# Patient Record
Sex: FEMALE | Race: ASIAN | NOT HISPANIC OR LATINO | URBAN - METROPOLITAN AREA
[De-identification: names, ages, dates, MRNs, and addresses within clinical notes are randomized per-mention and may not be internally consistent; named-entity substitution may affect disease eponyms.]

---

## 2017-01-11 ENCOUNTER — EMERGENCY (EMERGENCY)
Facility: HOSPITAL | Age: 37
LOS: 1 days | Discharge: HOME | End: 2017-01-11
Admitting: EMERGENCY MEDICINE
Payer: MEDICAID

## 2017-01-11 VITALS
HEART RATE: 85 BPM | RESPIRATION RATE: 18 BRPM | TEMPERATURE: 98 F | SYSTOLIC BLOOD PRESSURE: 132 MMHG | OXYGEN SATURATION: 98 % | DIASTOLIC BLOOD PRESSURE: 65 MMHG

## 2017-01-11 PROCEDURE — 99283 EMERGENCY DEPT VISIT LOW MDM: CPT

## 2017-01-11 RX ORDER — HYDROXYZINE HCL 10 MG
25 TABLET ORAL ONCE
Qty: 0 | Refills: 0 | Status: COMPLETED | OUTPATIENT
Start: 2017-01-11 | End: 2017-01-11

## 2017-01-11 RX ORDER — FAMOTIDINE 10 MG/ML
1 INJECTION INTRAVENOUS
Qty: 14 | Refills: 0 | OUTPATIENT
Start: 2017-01-11 | End: 2017-01-18

## 2017-01-11 RX ORDER — FAMOTIDINE 10 MG/ML
20 INJECTION INTRAVENOUS ONCE
Qty: 0 | Refills: 0 | Status: COMPLETED | OUTPATIENT
Start: 2017-01-11 | End: 2017-01-11

## 2017-01-11 RX ORDER — HYDROXYZINE HCL 10 MG
1 TABLET ORAL
Qty: 20 | Refills: 0 | OUTPATIENT
Start: 2017-01-11

## 2017-01-11 RX ADMIN — Medication 60 MILLIGRAM(S): at 09:05

## 2017-01-11 RX ADMIN — FAMOTIDINE 20 MILLIGRAM(S): 10 INJECTION INTRAVENOUS at 09:05

## 2017-01-11 RX ADMIN — Medication 25 MILLIGRAM(S): at 09:05

## 2017-01-11 NOTE — ED ADULT TRIAGE NOTE - CHIEF COMPLAINT QUOTE
p/t c/o of rash and itchiness  to bilateral thighs for few days denies any other discomfort no airway issues noted

## 2017-01-11 NOTE — ED PROVIDER NOTE - OBJECTIVE STATEMENT
36 yo female c pmhx hypothyroid, HLD here c/o pruritic rash to b/l inner thighs, shoulders and scalp since yesterday afternoon. Pt states ate spicy food yesterday which she normally does and shortly after started having this rash. Has had a fungal rash around the same area from prior which she used clotrimazole lotion for. Denies any fever, chills, recent travel, n/v, abd pain, sob, tongue swelling, using any new soaps/detergents or meds. Pt states took benadryl last night without much improvement.

## 2017-01-11 NOTE — ED PROVIDER NOTE - PLAN OF CARE
Take prednisone 40mg once a day x 4 days. Pepcid 20mg 2x/day. Atarax 25mg every 6 hours as needed for itching (do not drive while taking this). Drink plenty of fluids. Follow up with a dermatologist within 2 weeks. Return to ED for any worsening rash, tongue swelling, shortness of breath or any other worsening symptoms.

## 2017-01-11 NOTE — ED PROVIDER NOTE - MEDICAL DECISION MAKING DETAILS
37 yo female with pruritic hives to b/l inner thigh since yesterday. Unclear trigger. No resp distress. Will treat with prednisone, atarax, pepcid and give derm follow up.

## 2017-01-11 NOTE — ED PROVIDER NOTE - CARE PLAN
Principal Discharge DX:	Rash  Instructions for follow-up, activity and diet:	Take prednisone 40mg once a day x 4 days. Pepcid 20mg 2x/day. Atarax 25mg every 6 hours as needed for itching (do not drive while taking this). Drink plenty of fluids. Follow up with a dermatologist within 2 weeks. Return to ED for any worsening rash, tongue swelling, shortness of breath or any other worsening symptoms.

## 2017-01-17 ENCOUNTER — LABORATORY RESULT (OUTPATIENT)
Age: 37
End: 2017-01-17

## 2017-01-17 ENCOUNTER — APPOINTMENT (OUTPATIENT)
Dept: DERMATOLOGY | Facility: CLINIC | Age: 37
End: 2017-01-17

## 2017-01-17 VITALS
WEIGHT: 170 LBS | HEIGHT: 64 IN | SYSTOLIC BLOOD PRESSURE: 118 MMHG | BODY MASS INDEX: 29.02 KG/M2 | DIASTOLIC BLOOD PRESSURE: 74 MMHG

## 2017-01-17 DIAGNOSIS — L30.9 DERMATITIS, UNSPECIFIED: ICD-10-CM

## 2017-01-17 LAB
ALBUMIN SERPL ELPH-MCNC: 4.2 G/DL
ALP BLD-CCNC: 91 U/L
ALT SERPL-CCNC: 18 U/L
ANION GAP SERPL CALC-SCNC: 14 MMOL/L
AST SERPL-CCNC: 18 U/L
BILIRUB SERPL-MCNC: 0.2 MG/DL
BUN SERPL-MCNC: 14 MG/DL
CALCIUM SERPL-MCNC: 9.7 MG/DL
CHLORIDE SERPL-SCNC: 102 MMOL/L
CO2 SERPL-SCNC: 23 MMOL/L
CREAT SERPL-MCNC: 0.93 MG/DL
GLUCOSE SERPL-MCNC: 74 MG/DL
HIV1+2 AB SPEC QL IA.RAPID: NONREACTIVE
POTASSIUM SERPL-SCNC: 4.5 MMOL/L
PROT SERPL-MCNC: 6.9 G/DL
SODIUM SERPL-SCNC: 139 MMOL/L

## 2017-01-18 ENCOUNTER — RESULT REVIEW (OUTPATIENT)
Age: 37
End: 2017-01-18

## 2017-01-23 ENCOUNTER — RESULT REVIEW (OUTPATIENT)
Age: 37
End: 2017-01-23

## 2017-01-31 ENCOUNTER — APPOINTMENT (OUTPATIENT)
Dept: DERMATOLOGY | Facility: CLINIC | Age: 37
End: 2017-01-31

## 2017-01-31 VITALS — DIASTOLIC BLOOD PRESSURE: 82 MMHG | SYSTOLIC BLOOD PRESSURE: 126 MMHG

## 2017-01-31 DIAGNOSIS — L85.3 XEROSIS CUTIS: ICD-10-CM

## 2017-01-31 DIAGNOSIS — L30.0 NUMMULAR DERMATITIS: ICD-10-CM

## 2017-01-31 PROBLEM — E03.9 HYPOTHYROIDISM, UNSPECIFIED: Chronic | Status: ACTIVE | Noted: 2017-01-11

## 2017-01-31 PROBLEM — E78.5 HYPERLIPIDEMIA, UNSPECIFIED: Chronic | Status: ACTIVE | Noted: 2017-01-11

## 2017-05-01 ENCOUNTER — APPOINTMENT (OUTPATIENT)
Dept: DERMATOLOGY | Facility: CLINIC | Age: 37
End: 2017-05-01

## 2018-05-26 ENCOUNTER — EMERGENCY (EMERGENCY)
Facility: HOSPITAL | Age: 38
LOS: 1 days | Discharge: HOME | End: 2018-05-26
Attending: EMERGENCY MEDICINE | Admitting: EMERGENCY MEDICINE
Payer: MEDICAID

## 2018-05-26 VITALS
DIASTOLIC BLOOD PRESSURE: 94 MMHG | TEMPERATURE: 98 F | OXYGEN SATURATION: 100 % | RESPIRATION RATE: 17 BRPM | HEART RATE: 69 BPM | SYSTOLIC BLOOD PRESSURE: 154 MMHG

## 2018-05-26 VITALS
DIASTOLIC BLOOD PRESSURE: 68 MMHG | HEART RATE: 66 BPM | OXYGEN SATURATION: 100 % | SYSTOLIC BLOOD PRESSURE: 171 MMHG | RESPIRATION RATE: 17 BRPM

## 2018-05-26 LAB
ALBUMIN SERPL ELPH-MCNC: 4.4 G/DL — SIGNIFICANT CHANGE UP (ref 3.3–5)
ALP SERPL-CCNC: 97 U/L — SIGNIFICANT CHANGE UP (ref 40–120)
ALT FLD-CCNC: 14 U/L — SIGNIFICANT CHANGE UP (ref 4–33)
APTT BLD: 30.2 SEC — SIGNIFICANT CHANGE UP (ref 27.5–37.4)
AST SERPL-CCNC: 34 U/L — HIGH (ref 4–32)
BASOPHILS # BLD AUTO: 0.05 K/UL — SIGNIFICANT CHANGE UP (ref 0–0.2)
BASOPHILS NFR BLD AUTO: 0.6 % — SIGNIFICANT CHANGE UP (ref 0–2)
BILIRUB SERPL-MCNC: 0.3 MG/DL — SIGNIFICANT CHANGE UP (ref 0.2–1.2)
BLD GP AB SCN SERPL QL: NEGATIVE — SIGNIFICANT CHANGE UP
BUN SERPL-MCNC: 11 MG/DL — SIGNIFICANT CHANGE UP (ref 7–23)
CALCIUM SERPL-MCNC: 8.9 MG/DL — SIGNIFICANT CHANGE UP (ref 8.4–10.5)
CHLORIDE SERPL-SCNC: 100 MMOL/L — SIGNIFICANT CHANGE UP (ref 98–107)
CO2 SERPL-SCNC: 22 MMOL/L — SIGNIFICANT CHANGE UP (ref 22–31)
CREAT SERPL-MCNC: 0.85 MG/DL — SIGNIFICANT CHANGE UP (ref 0.5–1.3)
EOSINOPHIL # BLD AUTO: 0.18 K/UL — SIGNIFICANT CHANGE UP (ref 0–0.5)
EOSINOPHIL NFR BLD AUTO: 2.2 % — SIGNIFICANT CHANGE UP (ref 0–6)
GLUCOSE SERPL-MCNC: 122 MG/DL — HIGH (ref 70–99)
HCG SERPL-ACNC: < 5 MIU/ML — SIGNIFICANT CHANGE UP
HCT VFR BLD CALC: 38.2 % — SIGNIFICANT CHANGE UP (ref 34.5–45)
HGB BLD-MCNC: 12.5 G/DL — SIGNIFICANT CHANGE UP (ref 11.5–15.5)
IMM GRANULOCYTES # BLD AUTO: 0.03 # — SIGNIFICANT CHANGE UP
IMM GRANULOCYTES NFR BLD AUTO: 0.4 % — SIGNIFICANT CHANGE UP (ref 0–1.5)
INR BLD: 0.93 — SIGNIFICANT CHANGE UP (ref 0.88–1.17)
LYMPHOCYTES # BLD AUTO: 2.92 K/UL — SIGNIFICANT CHANGE UP (ref 1–3.3)
LYMPHOCYTES # BLD AUTO: 35.3 % — SIGNIFICANT CHANGE UP (ref 13–44)
MCHC RBC-ENTMCNC: 29.3 PG — SIGNIFICANT CHANGE UP (ref 27–34)
MCHC RBC-ENTMCNC: 32.7 % — SIGNIFICANT CHANGE UP (ref 32–36)
MCV RBC AUTO: 89.7 FL — SIGNIFICANT CHANGE UP (ref 80–100)
MONOCYTES # BLD AUTO: 0.66 K/UL — SIGNIFICANT CHANGE UP (ref 0–0.9)
MONOCYTES NFR BLD AUTO: 8 % — SIGNIFICANT CHANGE UP (ref 2–14)
NEUTROPHILS # BLD AUTO: 4.44 K/UL — SIGNIFICANT CHANGE UP (ref 1.8–7.4)
NEUTROPHILS NFR BLD AUTO: 53.5 % — SIGNIFICANT CHANGE UP (ref 43–77)
NRBC # FLD: 0 — SIGNIFICANT CHANGE UP
PLATELET # BLD AUTO: 190 K/UL — SIGNIFICANT CHANGE UP (ref 150–400)
PMV BLD: 12.6 FL — SIGNIFICANT CHANGE UP (ref 7–13)
POTASSIUM SERPL-MCNC: 4 MMOL/L — SIGNIFICANT CHANGE UP (ref 3.5–5.3)
POTASSIUM SERPL-SCNC: 4 MMOL/L — SIGNIFICANT CHANGE UP (ref 3.5–5.3)
PROT SERPL-MCNC: 7.2 G/DL — SIGNIFICANT CHANGE UP (ref 6–8.3)
PROTHROM AB SERPL-ACNC: 10.7 SEC — SIGNIFICANT CHANGE UP (ref 9.8–13.1)
RBC # BLD: 4.26 M/UL — SIGNIFICANT CHANGE UP (ref 3.8–5.2)
RBC # FLD: 12.4 % — SIGNIFICANT CHANGE UP (ref 10.3–14.5)
RH IG SCN BLD-IMP: POSITIVE — SIGNIFICANT CHANGE UP
SODIUM SERPL-SCNC: 137 MMOL/L — SIGNIFICANT CHANGE UP (ref 135–145)
WBC # BLD: 8.28 K/UL — SIGNIFICANT CHANGE UP (ref 3.8–10.5)
WBC # FLD AUTO: 8.28 K/UL — SIGNIFICANT CHANGE UP (ref 3.8–10.5)

## 2018-05-26 PROCEDURE — 73630 X-RAY EXAM OF FOOT: CPT | Mod: 26,RT

## 2018-05-26 PROCEDURE — 73590 X-RAY EXAM OF LOWER LEG: CPT | Mod: 26,RT

## 2018-05-26 PROCEDURE — 73562 X-RAY EXAM OF KNEE 3: CPT | Mod: 26,RT

## 2018-05-26 PROCEDURE — 99285 EMERGENCY DEPT VISIT HI MDM: CPT | Mod: 25

## 2018-05-26 PROCEDURE — 73610 X-RAY EXAM OF ANKLE: CPT | Mod: 26,RT

## 2018-05-26 RX ORDER — MORPHINE SULFATE 50 MG/1
4 CAPSULE, EXTENDED RELEASE ORAL ONCE
Qty: 0 | Refills: 0 | Status: DISCONTINUED | OUTPATIENT
Start: 2018-05-26 | End: 2018-05-26

## 2018-05-26 RX ORDER — SODIUM CHLORIDE 9 MG/ML
1000 INJECTION INTRAMUSCULAR; INTRAVENOUS; SUBCUTANEOUS ONCE
Qty: 0 | Refills: 0 | Status: COMPLETED | OUTPATIENT
Start: 2018-05-26 | End: 2018-05-26

## 2018-05-26 RX ORDER — IBUPROFEN 200 MG
1 TABLET ORAL
Qty: 30 | Refills: 0 | OUTPATIENT
Start: 2018-05-26

## 2018-05-26 RX ORDER — FENTANYL CITRATE 50 UG/ML
50 INJECTION INTRAVENOUS ONCE
Qty: 0 | Refills: 0 | Status: DISCONTINUED | OUTPATIENT
Start: 2018-05-26 | End: 2018-05-26

## 2018-05-26 RX ORDER — TETANUS TOXOID, REDUCED DIPHTHERIA TOXOID AND ACELLULAR PERTUSSIS VACCINE, ADSORBED 5; 2.5; 8; 8; 2.5 [IU]/.5ML; [IU]/.5ML; UG/.5ML; UG/.5ML; UG/.5ML
0.5 SUSPENSION INTRAMUSCULAR ONCE
Qty: 0 | Refills: 0 | Status: COMPLETED | OUTPATIENT
Start: 2018-05-26 | End: 2018-05-26

## 2018-05-26 RX ADMIN — MORPHINE SULFATE 4 MILLIGRAM(S): 50 CAPSULE, EXTENDED RELEASE ORAL at 08:27

## 2018-05-26 RX ADMIN — TETANUS TOXOID, REDUCED DIPHTHERIA TOXOID AND ACELLULAR PERTUSSIS VACCINE, ADSORBED 0.5 MILLILITER(S): 5; 2.5; 8; 8; 2.5 SUSPENSION INTRAMUSCULAR at 09:31

## 2018-05-26 RX ADMIN — SODIUM CHLORIDE 1000 MILLILITER(S): 9 INJECTION INTRAMUSCULAR; INTRAVENOUS; SUBCUTANEOUS at 09:32

## 2018-05-26 RX ADMIN — MORPHINE SULFATE 4 MILLIGRAM(S): 50 CAPSULE, EXTENDED RELEASE ORAL at 09:30

## 2018-05-26 RX ADMIN — FENTANYL CITRATE 50 MICROGRAM(S): 50 INJECTION INTRAVENOUS at 09:38

## 2018-05-26 NOTE — ED PROVIDER NOTE - OBJECTIVE STATEMENT
38 y/o F hx hypothyroidism here c/o right ankle pain sp falling down the stairs at home this am. Pt was walking down the stairs, when she twisted her ankle outwards. Hit her head, but no LOC. Was unable to ambulate immediately after fall. +pain to the R ankle and knee, w/ a visible deformity to the right ankle. No ext numbness/tingling. No neck pain. Denies other injuries or any other complaints. Tetanus status unknown. 36 y/o F hx hypothyroidism here c/o right ankle pain sp falling down the stairs at home this am. Pt was walking down the stairs, when she twisted her ankle outwards. Hit her head, but no LOC. Was unable to ambulate immediately after fall. +pain to the R ankle and knee, w/ a visible deformity to the right ankle. No ext numbness/tingling. No neck pain. Denies other injuries or any other complaints. Tetanus status unknown. Pt denies chance of pregnancy.

## 2018-05-26 NOTE — ED ADULT NURSE REASSESSMENT NOTE - NS ED NURSE REASSESS COMMENT FT1
post reduction x-ray complete and read. Pt dc by MD. IV DC as per hospital protocol by MD.  Pt expressed understanding of DC instructions.  wheeled to exit by .

## 2018-05-26 NOTE — ED ADULT NURSE REASSESSMENT NOTE - NS ED NURSE REASSESS COMMENT FT1
medication given as per MDs orders. Podiatry MD Waterhouse at bedside. family remains at bedside, will cont to monitor.

## 2018-05-26 NOTE — ED ADULT NURSE NOTE - OBJECTIVE STATEMENT
Patient received in room #23 c/o right foot pain s/p falling 2 steps this AM. Patient A&Ox3, reports she slipped on the stairs. Deformity noted to right foot/ankle. Patient denies any blood thinners, LOC, or head trauma. VS as noted. 20G IV Placed in left ac, labs drawn and sent. MD at bedside for evaluation. Will monitor.

## 2018-05-26 NOTE — ED PROVIDER NOTE - ATTENDING CONTRIBUTION TO CARE
I was physically present for the E/M service provided. I agree with above history, physical, and plan which I have reviewed and edited where appropriate. I was physically present for the key portions of the service provided.    Attending Exam - Dr. Mars: GEN: well appearing, NAD  HEENT: +PERRL, EOMI  RESP: CTAB, no signs of respiratory distress CV: s1s2 RRR ABD: soft/non tender/non distended  MSK: R ankle swollen, tender, deformed, +capillary refill distal, no motor deficits, tenderness around R knee without gross deformity. NEURO: alert, non focal exam SKIN: normal color / temperature / condition.

## 2018-05-26 NOTE — ED PROVIDER NOTE - PHYSICAL EXAMINATION
Right lower ext- visible deformity noted to ankle w/ erythema to foot, unable to range ankle, +ttp ankle along medial and lateral joint lines, nontender femur/knee, unable to flex knee due to pain, no proximal tib/fib tenderness, abrasion noted to dorsal foot w/ dried blood, cap refill <2 sec, sensation intact, unable to ambulate.

## 2018-05-26 NOTE — ED PROVIDER NOTE - PLAN OF CARE
Follow up with your PMD within 48-72 hours.  Follow up with Dr. Sellers on 05/29/18 or 05/30/18, call [phone] to make an appointment ([address] [place] [address]). Take Motrin 600mg every 8hrs for pain with food.  Percocet 1 tablets every 6 hrs as needed for severe pain that is not better with Motrin, caution drowsiness while taking this medication- do not drive or operate heavy machinery. Ambulate using crutch assistance, DO NOT PUT WEIGHT ON THE RIGHT LEG. Worsening pain, swelling, numbness, weakness return to ER

## 2018-05-26 NOTE — ED PROVIDER NOTE - MEDICAL DECISION MAKING DETAILS
will need to xray to r/o fracture vs dislocation vs fracture/dislocation.  Pain control, will send pre-ops as may require surgery.

## 2018-05-26 NOTE — CONSULT NOTE ADULT - ASSESSMENT
38yo F w/ R ankle fx - trimalleolar equivalent w/ possible small avulsion PTFL (presumed ruptured 2/2 mechanism)  ·	Pt seen and evaluated  ·	Pt injected with 16cc 1% lidocaine plain after prepping skin with alcohol, hematoma aspirated within ankle joint and then at fibular fx  ·	Pt comfortable for closed reduction technique  ·	Closed reduction performed & ankle joint realigned to satisfactory reduction, no tenting noted  ·	Pulses were dopplerable during and following reduction of ankle joint  ·	Bacitracin dsd applied to abrasion  ·	Webril, ACE applied  ·	Trilam splint with extra webril padding applied  ·	Pt to be NWB to RLE at all times  ·	Crutches at bedside  ·	Please follow up with Dr. Sellers within 1 week call [phone] (likely Tuesday or Wednesday)  ·	Long discussion with pt regarding surgical correction  ·	Pt & family understands  ·	Pt toes exposed, if any reduction in CFT to immediately return to ED, pt understood  ·	d/w attendings

## 2018-05-26 NOTE — CONSULT NOTE ADULT - SUBJECTIVE AND OBJECTIVE BOX
Patient is a 37y old  Female who presents with a chief complaint of     HPI: 36 y/o F hx hypothyroidism here c/o right ankle pain sp falling down the stairs at home this am. Pt was walking down the stairs, when she twisted her ankle outwards. Hit her head, but no LOC. Was unable to ambulate immediately after fall. +pain to the R ankle and knee, w/ a visible deformity to the right ankle. No ext numbness/tingling. No neck pain. Denies other injuries or any other complaints. Tetanus status unknown. Pt denies chance of pregnancy.      PAST MEDICAL & SURGICAL HISTORY:  HLD (hyperlipidemia)  Hypothyroid  No significant past surgical history      MEDICATIONS  (STANDING):    MEDICATIONS  (PRN):      Allergies    No Known Allergies    Intolerances        VITALS:    Vital Signs Last 24 Hrs  T(C): 36.6 (26 May 2018 07:39), Max: 36.6 (26 May 2018 07:39)  T(F): 97.8 (26 May 2018 07:39), Max: 97.8 (26 May 2018 07:39)  HR: 66 (26 May 2018 09:38) (66 - 78)  BP: 171/68 (26 May 2018 09:38) (154/94 - 171/68)  BP(mean): --  RR: 17 (26 May 2018 09:38) (17 - 18)  SpO2: 100% (26 May 2018 09:38) (100% - 100%)    LABS:                          12.5   8.28  )-----------( 190      ( 26 May 2018 08:00 )             38.2       05-26    137  |  100  |  11  ----------------------------<  122<H>  4.0   |  22  |  0.85    Ca    8.9      26 May 2018 08:00    TPro  7.2  /  Alb  4.4  /  TBili  0.3  /  DBili  x   /  AST  34<H>  /  ALT  14  /  AlkPhos  97  05-26      CAPILLARY BLOOD GLUCOSE          PT/INR - ( 26 May 2018 08:00 )   PT: 10.7 SEC;   INR: 0.93          PTT - ( 26 May 2018 08:00 )  PTT:30.2 SEC    LOWER EXTREMITY PHYSICAL EXAM:    Vascular: DP/PT NP on initial exam 2/2 swelling to RF, LF 2/4 to DP/PT, CFT <3 seconds B/L with ecchymosis noted posteriorly perimalleolar distribution, slight coolness tenting medial malleolus noted, Temperature gradient WNL warm to cool on both sides.   Neuro: Epicritic sensation intact to the level of foot, B/L.  Musculoskeletal/Ortho: pain on palpation circumferentially about ankle joint of RLE, pain at medial and lateral malleolus & pain with any motion at ankle  Skin: no open lesions noted about ankle joint only slight tenting medial malleolus noted, no SOI, small circular abrasion to distal dorsal midfoot slight ooze resolves w/ compression    Upon reduction no further tenting noted at both malleoli & ankle joint ROM reestablished w/ motion tolerable by pt  Dopplerable pulses at DP/PT       RADIOLOGY & ADDITIONAL STUDIES:    < from: Xray Ankle Complete 3 Views, Right (05.26.18 @ 09:29) >    EXAM:  RAD FOOT MIN 3 VIEWS RIGHT      EXAM:  RAD TIB-FIB RT      EXAM:  RAD ANKLE MIN 3 VIEWS RIGHT      EXAM:  RAD KNEE 3 VIEWS RIGHT        PROCEDURE DATE:  May 26 2018         INTERPRETATION:  CLINICAL INFORMATION: Right leg pain status post fall.    TECHNIQUE: 3 views of the right knee. 2 views of the right tibia/fibula.   2 views of the right ankle. 3 views of the right foot.    COMPARISON: None available.    FINDINGS    Right knee: No acute fracture or dislocation. Joint spaces are   maintained. No knee joint effusion.    Right tibia/fibula: No acute fracture of the proximal tibia/fibula.    Right ankle:       Posterior medial tibiotalar dislocation. Associated oblique fracture   through the lateral malleolus above the level of the anterior syndesmosis   with superoposterior displacement of the fracture fragment. Transverse   fracture through the medial malleolus. There is an additional linear   fracture fragment medial to the medial malleolus which may possibly   represent a posterior malleolar fracture. Large knee joint effusion.    Right foot: No acute fracture or dislocation. The joint spaces are   maintained.    IMPRESSION:    Limited view of the right ankle.    Complex right ankle fracture/dislocation with distal fibular and tibial   fractures likely trimalleolar.    CT would be more sensitive.              HERNANDEZ CONTEH M.D., RADIOLOGY RESIDENT  This document has been electronically signed.  NOLA FLORES M.D.; ATTENDING RADIOLOGIST  This document has been electronically signed. May 26 2018 10:00AM                  < end of copied text >  < from: Xray Ankle Complete 3 Views, Right (05.26.18 @ 11:23) >    EXAM:  RAD ANKLE MIN 3 VIEWS RIGHT        PROCEDURE DATE:  May 26 2018         INTERPRETATION:  TIME OF EXAM: May 26, 2018 at oh 11:10 AM    CLINICAL INFORMATION: Post splinting of right ankle fracture dislocation    TECHNIQUE:   3 radiographs of the right ankle:    INTERPRETATION:     Since the last study, a posterior splint is been applied. Fracture in   satisfactory alignment.      COMPARISON:  May 26 at 10:16 AM      IMPRESSION:  Status post reduction and splinting of bimalleolar, ankle   dislocation.                  NOLA FLORES M.D.; ATTENDING RADIOLOGIST  This document has been electronically signed. May 26 2018 11:33AM                  < end of copied text >

## 2018-05-26 NOTE — ED PROVIDER NOTE - CARE PLAN
Principal Discharge DX:	Trimalleolar fracture of ankle, closed  Assessment and plan of treatment:	Follow up with your PMD within 48-72 hours.  Follow up with Dr. Sellers on 05/29/18 or 05/30/18, call [phone] to make an appointment ([address] [place] [address]). Take Motrin 600mg every 8hrs for pain with food.  Percocet 1 tablets every 6 hrs as needed for severe pain that is not better with Motrin, caution drowsiness while taking this medication- do not drive or operate heavy machinery. Ambulate using crutch assistance, DO NOT PUT WEIGHT ON THE RIGHT LEG. Worsening pain, swelling, numbness, weakness return to ER  Secondary Diagnosis:	Dislocation of right ankle joint, initial encounter

## 2018-05-26 NOTE — ED ADULT TRIAGE NOTE - CHIEF COMPLAINT QUOTE
PT BIBEMS [facility] from Home. c/o Pain, Deformity on Right foot. s/p Slip/Fall about 2 steps of a stair. pedal pulses noted Denies Hitting her head. LOC

## 2018-05-26 NOTE — ED PROVIDER NOTE - PROGRESS NOTE DETAILS
Podiatry at bedside to attempt to reduce ankle ADAM Franco: Ankle reduced w/podiatry, DP and PT pulses wnl, checked w/ doppler. Pt is feeling better, able to actively range ankle, erythema of foot receding. Pending post reduction films. ADAM Franco: Pt seen by case management to facilitate getting a commode for the house. Podiatry applied a trimall. splint, gave crutch training, recommend follow up with Dr. Sellers on either 05/29 or 05/30 for operative planning. Pt ok with plan. RX for percocet and motrin sent.

## 2018-05-29 ENCOUNTER — INPATIENT (INPATIENT)
Facility: HOSPITAL | Age: 38
LOS: 12 days | Discharge: HOME HEALTH | End: 2018-06-11
Attending: HOSPITALIST | Admitting: HOSPITALIST
Payer: MEDICAID

## 2018-05-29 VITALS
SYSTOLIC BLOOD PRESSURE: 149 MMHG | HEART RATE: 89 BPM | DIASTOLIC BLOOD PRESSURE: 87 MMHG | OXYGEN SATURATION: 100 % | TEMPERATURE: 98 F | RESPIRATION RATE: 16 BRPM

## 2018-05-29 DIAGNOSIS — E11.8 TYPE 2 DIABETES MELLITUS WITH UNSPECIFIED COMPLICATIONS: Chronic | ICD-10-CM

## 2018-05-29 DIAGNOSIS — S82.891S OTHER FRACTURE OF RIGHT LOWER LEG, SEQUELA: ICD-10-CM

## 2018-05-29 DIAGNOSIS — E03.9 HYPOTHYROIDISM, UNSPECIFIED: ICD-10-CM

## 2018-05-29 DIAGNOSIS — M79.609 PAIN IN UNSPECIFIED LIMB: ICD-10-CM

## 2018-05-29 DIAGNOSIS — Z29.9 ENCOUNTER FOR PROPHYLACTIC MEASURES, UNSPECIFIED: ICD-10-CM

## 2018-05-29 LAB
ALBUMIN SERPL ELPH-MCNC: 3.9 G/DL — SIGNIFICANT CHANGE UP (ref 3.3–5)
ALP SERPL-CCNC: 88 U/L — SIGNIFICANT CHANGE UP (ref 40–120)
ALT FLD-CCNC: 11 U/L — SIGNIFICANT CHANGE UP (ref 4–33)
APTT BLD: 30.8 SEC — SIGNIFICANT CHANGE UP (ref 27.5–37.4)
AST SERPL-CCNC: 24 U/L — SIGNIFICANT CHANGE UP (ref 4–32)
BASOPHILS # BLD AUTO: 0.02 K/UL — SIGNIFICANT CHANGE UP (ref 0–0.2)
BASOPHILS NFR BLD AUTO: 0.4 % — SIGNIFICANT CHANGE UP (ref 0–2)
BILIRUB SERPL-MCNC: 0.4 MG/DL — SIGNIFICANT CHANGE UP (ref 0.2–1.2)
BUN SERPL-MCNC: 8 MG/DL — SIGNIFICANT CHANGE UP (ref 7–23)
CALCIUM SERPL-MCNC: 9.1 MG/DL — SIGNIFICANT CHANGE UP (ref 8.4–10.5)
CHLORIDE SERPL-SCNC: 99 MMOL/L — SIGNIFICANT CHANGE UP (ref 98–107)
CO2 SERPL-SCNC: 23 MMOL/L — SIGNIFICANT CHANGE UP (ref 22–31)
CREAT SERPL-MCNC: 0.7 MG/DL — SIGNIFICANT CHANGE UP (ref 0.5–1.3)
EOSINOPHIL # BLD AUTO: 0.11 K/UL — SIGNIFICANT CHANGE UP (ref 0–0.5)
EOSINOPHIL NFR BLD AUTO: 2 % — SIGNIFICANT CHANGE UP (ref 0–6)
GLUCOSE SERPL-MCNC: 126 MG/DL — HIGH (ref 70–99)
HCT VFR BLD CALC: 34.1 % — LOW (ref 34.5–45)
HGB BLD-MCNC: 11.3 G/DL — LOW (ref 11.5–15.5)
IMM GRANULOCYTES # BLD AUTO: 0.01 # — SIGNIFICANT CHANGE UP
IMM GRANULOCYTES NFR BLD AUTO: 0.2 % — SIGNIFICANT CHANGE UP (ref 0–1.5)
INR BLD: 1.03 — SIGNIFICANT CHANGE UP (ref 0.88–1.17)
LYMPHOCYTES # BLD AUTO: 1.85 K/UL — SIGNIFICANT CHANGE UP (ref 1–3.3)
LYMPHOCYTES # BLD AUTO: 33.3 % — SIGNIFICANT CHANGE UP (ref 13–44)
MCHC RBC-ENTMCNC: 29.8 PG — SIGNIFICANT CHANGE UP (ref 27–34)
MCHC RBC-ENTMCNC: 33.1 % — SIGNIFICANT CHANGE UP (ref 32–36)
MCV RBC AUTO: 90 FL — SIGNIFICANT CHANGE UP (ref 80–100)
MONOCYTES # BLD AUTO: 0.46 K/UL — SIGNIFICANT CHANGE UP (ref 0–0.9)
MONOCYTES NFR BLD AUTO: 8.3 % — SIGNIFICANT CHANGE UP (ref 2–14)
NEUTROPHILS # BLD AUTO: 3.11 K/UL — SIGNIFICANT CHANGE UP (ref 1.8–7.4)
NEUTROPHILS NFR BLD AUTO: 55.8 % — SIGNIFICANT CHANGE UP (ref 43–77)
NRBC # FLD: 0 — SIGNIFICANT CHANGE UP
PLATELET # BLD AUTO: 202 K/UL — SIGNIFICANT CHANGE UP (ref 150–400)
PMV BLD: 12.2 FL — SIGNIFICANT CHANGE UP (ref 7–13)
POTASSIUM SERPL-MCNC: 4.1 MMOL/L — SIGNIFICANT CHANGE UP (ref 3.5–5.3)
POTASSIUM SERPL-SCNC: 4.1 MMOL/L — SIGNIFICANT CHANGE UP (ref 3.5–5.3)
PROT SERPL-MCNC: 6.6 G/DL — SIGNIFICANT CHANGE UP (ref 6–8.3)
PROTHROM AB SERPL-ACNC: 11.8 SEC — SIGNIFICANT CHANGE UP (ref 9.8–13.1)
RBC # BLD: 3.79 M/UL — LOW (ref 3.8–5.2)
RBC # FLD: 12.6 % — SIGNIFICANT CHANGE UP (ref 10.3–14.5)
SODIUM SERPL-SCNC: 137 MMOL/L — SIGNIFICANT CHANGE UP (ref 135–145)
WBC # BLD: 5.56 K/UL — SIGNIFICANT CHANGE UP (ref 3.8–10.5)
WBC # FLD AUTO: 5.56 K/UL — SIGNIFICANT CHANGE UP (ref 3.8–10.5)

## 2018-05-29 PROCEDURE — 73610 X-RAY EXAM OF ANKLE: CPT | Mod: 26,RT

## 2018-05-29 PROCEDURE — 93971 EXTREMITY STUDY: CPT | Mod: 26,LT

## 2018-05-29 PROCEDURE — 99223 1ST HOSP IP/OBS HIGH 75: CPT

## 2018-05-29 PROCEDURE — 73630 X-RAY EXAM OF FOOT: CPT | Mod: 26,RT

## 2018-05-29 RX ORDER — DIPHENHYDRAMINE HCL 50 MG
50 CAPSULE ORAL ONCE
Qty: 0 | Refills: 0 | Status: COMPLETED | OUTPATIENT
Start: 2018-05-29 | End: 2018-05-29

## 2018-05-29 RX ORDER — OXYCODONE AND ACETAMINOPHEN 5; 325 MG/1; MG/1
2 TABLET ORAL EVERY 4 HOURS
Qty: 0 | Refills: 0 | Status: DISCONTINUED | OUTPATIENT
Start: 2018-05-29 | End: 2018-05-31

## 2018-05-29 RX ORDER — OXYCODONE AND ACETAMINOPHEN 5; 325 MG/1; MG/1
1 TABLET ORAL ONCE
Qty: 0 | Refills: 0 | Status: DISCONTINUED | OUTPATIENT
Start: 2018-05-29 | End: 2018-05-29

## 2018-05-29 RX ORDER — LEVOTHYROXINE SODIUM 125 MCG
0 TABLET ORAL
Qty: 0 | Refills: 0 | COMMUNITY

## 2018-05-29 RX ORDER — ONDANSETRON 8 MG/1
4 TABLET, FILM COATED ORAL EVERY 6 HOURS
Qty: 0 | Refills: 0 | Status: DISCONTINUED | OUTPATIENT
Start: 2018-05-29 | End: 2018-06-11

## 2018-05-29 RX ORDER — OXYCODONE AND ACETAMINOPHEN 5; 325 MG/1; MG/1
1 TABLET ORAL EVERY 4 HOURS
Qty: 0 | Refills: 0 | Status: DISCONTINUED | OUTPATIENT
Start: 2018-05-29 | End: 2018-05-31

## 2018-05-29 RX ORDER — HYDROMORPHONE HYDROCHLORIDE 2 MG/ML
1 INJECTION INTRAMUSCULAR; INTRAVENOUS; SUBCUTANEOUS ONCE
Qty: 0 | Refills: 0 | Status: DISCONTINUED | OUTPATIENT
Start: 2018-05-29 | End: 2018-05-29

## 2018-05-29 RX ORDER — GEMFIBROZIL 600 MG
0 TABLET ORAL
Qty: 0 | Refills: 0 | COMMUNITY

## 2018-05-29 RX ORDER — LEVOTHYROXINE SODIUM 125 MCG
50 TABLET ORAL DAILY
Qty: 0 | Refills: 0 | Status: DISCONTINUED | OUTPATIENT
Start: 2018-05-29 | End: 2018-06-11

## 2018-05-29 RX ADMIN — OXYCODONE AND ACETAMINOPHEN 1 TABLET(S): 5; 325 TABLET ORAL at 12:59

## 2018-05-29 RX ADMIN — HYDROMORPHONE HYDROCHLORIDE 1 MILLIGRAM(S): 2 INJECTION INTRAMUSCULAR; INTRAVENOUS; SUBCUTANEOUS at 14:30

## 2018-05-29 RX ADMIN — OXYCODONE AND ACETAMINOPHEN 1 TABLET(S): 5; 325 TABLET ORAL at 11:59

## 2018-05-29 RX ADMIN — HYDROMORPHONE HYDROCHLORIDE 1 MILLIGRAM(S): 2 INJECTION INTRAMUSCULAR; INTRAVENOUS; SUBCUTANEOUS at 14:12

## 2018-05-29 NOTE — ED PROVIDER NOTE - CONSTITUTIONAL, MLM
normal... Well appearing, well nourished, awake, alert, oriented to person, place, time/situation and in no apparent distress. Well appearing, well nourished, awake, alert, oriented to person, place, time/situation and in no mild pain

## 2018-05-29 NOTE — ED PROVIDER NOTE - MEDICAL DECISION MAKING DETAILS
Extremity pain, Right LE Sp fx-  Podiatry consulted/splint removal by podiatry , US, pain control, re-eval

## 2018-05-29 NOTE — ED ADULT TRIAGE NOTE - CHIEF COMPLAINT QUOTE
states" I have severe pain in my right ankle and is swollen" h/o fall on Saturday with fracture ankle", patient was sent home on cast . unable to make an appointment with Ortho. right foot is grossly swollen. unable to get pain under control. h/o rheumatic heart disease, hypothyroidism

## 2018-05-29 NOTE — ED PROVIDER NOTE - ATTENDING CONTRIBUTION TO CARE
I performed a face to face evaluation of this patient and obtained a history and performed a full exam.  I agree with the history, physical exam and plan of the PA.  Pt with right ankle fracture seen prior by podiatry, came b/c of worse pain to ankle, no cp or sob.  Pt awake In some mild pain, right ankle with fracture blister anterior ankle, distal neurovascular intact.  heart and lung wnl, for xrays, podiatry and pain meds

## 2018-05-29 NOTE — ED ADULT NURSE NOTE - OBJECTIVE STATEMENT
Pt to bed 9. Alert and oriented x 3. Pt with right ankle pain and swelling s/p trip and fall. Ankle swollen with large blister noted.  Pt seen and ordered pain medication and ultrasound.

## 2018-05-29 NOTE — H&P ADULT - HISTORY OF PRESENT ILLNESS
37F hx of Hypothyroidism presents to [facility] ED c/o ankle pain. On Saturday AM patient fell down the stairs and twisted her ankle outwards. She went to the ED where she was seen by podiatry, and they had diagnosed a right ankle fracture, performed hematoma aspiration of the ankle joint followed by closed reduction of her right ankle. She was then discharged home. Despite this intervention, the patient continued to experience increased swelling and pain and returned today for evaluation. What concerned her in particular was that she was forming blisters on her ankle. No fevers or chills.     In the ED patient was seen by Podiatry, who performed lancing of serous blister, applied a splint, and recommended admission for pain control, rest (non weight bearing) and possibility for surgical intervention. Patient currently feels dizzy but relieved from pain medication. She was given 1 mg IV Dilaudid x 1, 50 mg IV Benadryl, and Percocet x 1.

## 2018-05-29 NOTE — H&P ADULT - NSHPPHYSICALEXAM_GEN_ALL_CORE
Vital Signs Last 24 Hrs  T(C): 36.7 (29 May 2018 16:11), Max: 36.8 (29 May 2018 10:55)  T(F): 98 (29 May 2018 16:11), Max: 98.2 (29 May 2018 10:55)  HR: 66 (29 May 2018 16:11) (66 - 89)  BP: 149/70 (29 May 2018 16:11) (149/70 - 149/87)  BP(mean): --  RR: 16 (29 May 2018 16:11) (16 - 16)  SpO2: 100% (29 May 2018 16:11) (100% - 100%)    General: uncomfortable 2/2 pain, non-toxic appearing, speaking in full sentences   HEENT: EOMI, PERRLA, no conjunctival pallor, MMM, no JVD, no thyromegaly, neck supple, trachea midline  CV: S1S2 RRR no MRG  Lungs: CTA BL  Abdomen: soft NTND +BS   Extremities: No CCE +WWP, (+) Right ankle wrapped in ace bandages   Skin/MSK: No rashes, preserved ROM on active & passive movement  Neuro: AAOx3, no focal deficits (5/5 strength all extremities), no sensory deficits

## 2018-05-29 NOTE — H&P ADULT - ASSESSMENT
37F hx of Hypothyroidism being admitted for pain control of fractured R ankle with possibility for surgical intervention.

## 2018-05-29 NOTE — ED PROVIDER NOTE - PROGRESS NOTE DETAILS
Julio César podiatry, will be admitted for pain control to [facility] w/ possibility of surgery sooner with Dr Monge.  once podiatry is able to reach him.    Pending CB from [facility] for admission. Julio César podiatry, will be admitted for pain control to [facility] w/ possibility of surgery sooner with Dr Monge.  once podiatry is able to reach him.    podiatry requesting repeat xray   Pending CB from [facility] for admission. Admitted to Dr Monge Admitted to Dr Mariposa wilson , aware of pt. Made aware no labs drawn. They will take care of doing labs as per MAR

## 2018-05-29 NOTE — ED PROVIDER NOTE - OBJECTIVE STATEMENT
38 yo female, high cholesterol, hypothyroid dz presents to the ED with right LE swelling/increasing ankle pain over the past 2 days.  No significant relief with meds!  Pt was in the ED 3 days ago, splinted/reduced darleen fx.  Denies any f/v/sensation/temp changes to the extremity.

## 2018-05-29 NOTE — H&P ADULT - FAMILY HISTORY
Mother  Still living? Unknown  Family history of diabetes mellitus, Age at diagnosis: Age Unknown     Father  Still living? Unknown  Family history of cancer in father, Age at diagnosis: Age Unknown

## 2018-05-29 NOTE — H&P ADULT - PROBLEM SELECTOR PLAN 1
c/w pain control with percocet. Appreciate Podiatry recommendations. Will order pre-operative labs & EKG tomorrow if podiatry feels that the patient needs surgical intervention.

## 2018-05-29 NOTE — H&P ADULT - NSHPLABSRESULTS_GEN_ALL_CORE
< from: US Duplex Venous Lower Ext Ltd, Right (05.29.18 @ 12:50) >  IMPRESSION:   No evidence of right lower extremity deep venous thrombosis.  < end of copied text >    < from: Xray Ankle Complete 3 Views, Right (05.26.18 @ 11:23) >  IMPRESSION:  Status post reduction and splinting of bimalleolar, ankle   dislocation.  < end of copied text >

## 2018-05-29 NOTE — H&P ADULT - PROBLEM SELECTOR PLAN 3
IMPROVE VTE Individual Risk Assessment, Score = 1, low risk, also patient with recent aspiration of hematoma in ankle, will hold HSQ for now            RISK                                                          Points  [  ] Previous VTE                                               3  [  ] Thrombophilia                                            2  [  ] Lower limb paresis/paralysis                    2    [  ] Active Cancer (in last 6 months)              2   [ x ] Immobilization > 24 hrs                             1  [  ] ICU/CCU stay > 24 hours                            1  [  ] Age > 60                                                        1                                            Total Score ___1______

## 2018-05-29 NOTE — CONSULT NOTE ADULT - ASSESSMENT
36yo F w/ R ankle fx s/p closed reduction (5/26) of trimalleolar equivalent w/ possible small avulsion PTFL (presumed ruptured 2/2 mechanism)  ·	Pt seen and evaluated  ·	Pt noted to have hemorrhagic & serous blisters  ·	Serous blister laterally lanced using sterile technique  ·	Webril, ACE applied  ·	Sugar tong splint with extra webril padding reapplied  ·	Pt to be NWB to RLE at all times  ·	Pt to be admitted for pain control and possible surgical intervention pending appearance of soft tissue envelope circumferentially around ankle joint, likely external fixation w/ delta frame at this time vs ORIF  ·	Long discussion with pt regarding surgical correction  ·	Pt & family understands  ·	Please keep on Ancef at this time due to drainage of serous lateral blister  ·	XR pending, do not expect any changes from prior films   ·	d/w attending

## 2018-05-29 NOTE — H&P ADULT - NSHPREVIEWOFSYSTEMS_GEN_ALL_CORE
REVIEW OF SYSTEMS:    CONSTITUTIONAL: No weakness, fevers or chills  EYES/ENT: No visual changes;  No vertigo or throat pain   NECK: No pain or stiffness  RESPIRATORY: No cough, wheezing, hemoptysis; No shortness of breath  CARDIOVASCULAR: No chest pain or palpitations  GASTROINTESTINAL: No abdominal or epigastric pain. No nausea, vomiting, or hematemesis; No diarrhea or constipation. No melena or hematochezia.  GENITOURINARY: No dysuria, frequency or hematuria  NEUROLOGICAL: No numbness or weakness  SKIN: No itching, burning, rashes, or lesions   MSK: (+) Right ankle fracture and pain   All other review of systems is negative unless indicated above.

## 2018-05-29 NOTE — CONSULT NOTE ADULT - SUBJECTIVE AND OBJECTIVE BOX
Patient is a 37y old  Female who presents with a chief complaint of     HPI:      PAST MEDICAL & SURGICAL HISTORY:  HLD (hyperlipidemia)  Hypothyroid  No significant past surgical history      MEDICATIONS  (STANDING):  HYDROmorphone  Injectable 1 milliGRAM(s) IV Push Once    MEDICATIONS  (PRN):      Allergies    No Known Allergies    Intolerances        VITALS:    Vital Signs Last 24 Hrs  T(C): 36.8 (29 May 2018 10:55), Max: 36.8 (29 May 2018 10:55)  T(F): 98.2 (29 May 2018 10:55), Max: 98.2 (29 May 2018 10:55)  HR: 89 (29 May 2018 10:55) (89 - 89)  BP: 149/87 (29 May 2018 10:55) (149/87 - 149/87)  BP(mean): --  RR: 16 (29 May 2018 10:55) (16 - 16)  SpO2: 100% (29 May 2018 10:55) (100% - 100%)    LABS:                CAPILLARY BLOOD GLUCOSE              LOWER EXTREMITY PHYSICAL EXAM:    Vascular: DP/PT 0/4 2/2 edema (dopplerable), CFT <3 seconds B/L, Temperature gradient WNL, B/L.   Neuro: Epicritic sensation intact to the level of digits, B/L.  Musculoskeletal/Ortho: able to wiggle toes and slightly ROM ankle joint  Skin: hemorrhagic blister anterior to ankle joint & serous blister lateral aspect ankle joint, lesion previously at dorsal forefoot is no longer open, no SOI, swelling significantly reduced from initial presentation without any tenting, ecchymosis significant and perimalleolar with some extension along the lateral leg      RADIOLOGY & ADDITIONAL STUDIES:

## 2018-05-30 ENCOUNTER — TRANSCRIPTION ENCOUNTER (OUTPATIENT)
Age: 38
End: 2018-05-30

## 2018-05-30 LAB
ALBUMIN SERPL ELPH-MCNC: 3.8 G/DL — SIGNIFICANT CHANGE UP (ref 3.3–5)
ALP SERPL-CCNC: 84 U/L — SIGNIFICANT CHANGE UP (ref 40–120)
ALT FLD-CCNC: 12 U/L — SIGNIFICANT CHANGE UP (ref 4–33)
APTT BLD: 31.1 SEC — SIGNIFICANT CHANGE UP (ref 27.5–37.4)
AST SERPL-CCNC: 23 U/L — SIGNIFICANT CHANGE UP (ref 4–32)
BASOPHILS # BLD AUTO: 0.03 K/UL — SIGNIFICANT CHANGE UP (ref 0–0.2)
BASOPHILS NFR BLD AUTO: 0.5 % — SIGNIFICANT CHANGE UP (ref 0–2)
BILIRUB SERPL-MCNC: 0.5 MG/DL — SIGNIFICANT CHANGE UP (ref 0.2–1.2)
BLD GP AB SCN SERPL QL: NEGATIVE — SIGNIFICANT CHANGE UP
BUN SERPL-MCNC: 10 MG/DL — SIGNIFICANT CHANGE UP (ref 7–23)
CALCIUM SERPL-MCNC: 9.2 MG/DL — SIGNIFICANT CHANGE UP (ref 8.4–10.5)
CHLORIDE SERPL-SCNC: 99 MMOL/L — SIGNIFICANT CHANGE UP (ref 98–107)
CO2 SERPL-SCNC: 24 MMOL/L — SIGNIFICANT CHANGE UP (ref 22–31)
CREAT SERPL-MCNC: 0.73 MG/DL — SIGNIFICANT CHANGE UP (ref 0.5–1.3)
EOSINOPHIL # BLD AUTO: 0.18 K/UL — SIGNIFICANT CHANGE UP (ref 0–0.5)
EOSINOPHIL NFR BLD AUTO: 2.9 % — SIGNIFICANT CHANGE UP (ref 0–6)
GLUCOSE SERPL-MCNC: 103 MG/DL — HIGH (ref 70–99)
HCG SERPL-ACNC: < 5 MIU/ML — SIGNIFICANT CHANGE UP
HCT VFR BLD CALC: 34.4 % — LOW (ref 34.5–45)
HGB BLD-MCNC: 11.6 G/DL — SIGNIFICANT CHANGE UP (ref 11.5–15.5)
IMM GRANULOCYTES # BLD AUTO: 0.02 # — SIGNIFICANT CHANGE UP
IMM GRANULOCYTES NFR BLD AUTO: 0.3 % — SIGNIFICANT CHANGE UP (ref 0–1.5)
INR BLD: 0.97 — SIGNIFICANT CHANGE UP (ref 0.88–1.17)
LYMPHOCYTES # BLD AUTO: 2.14 K/UL — SIGNIFICANT CHANGE UP (ref 1–3.3)
LYMPHOCYTES # BLD AUTO: 34.3 % — SIGNIFICANT CHANGE UP (ref 13–44)
MCHC RBC-ENTMCNC: 30.3 PG — SIGNIFICANT CHANGE UP (ref 27–34)
MCHC RBC-ENTMCNC: 33.7 % — SIGNIFICANT CHANGE UP (ref 32–36)
MCV RBC AUTO: 89.8 FL — SIGNIFICANT CHANGE UP (ref 80–100)
MONOCYTES # BLD AUTO: 0.47 K/UL — SIGNIFICANT CHANGE UP (ref 0–0.9)
MONOCYTES NFR BLD AUTO: 7.5 % — SIGNIFICANT CHANGE UP (ref 2–14)
NEUTROPHILS # BLD AUTO: 3.39 K/UL — SIGNIFICANT CHANGE UP (ref 1.8–7.4)
NEUTROPHILS NFR BLD AUTO: 54.5 % — SIGNIFICANT CHANGE UP (ref 43–77)
NRBC # FLD: 0 — SIGNIFICANT CHANGE UP
PLATELET # BLD AUTO: 208 K/UL — SIGNIFICANT CHANGE UP (ref 150–400)
PMV BLD: 12.8 FL — SIGNIFICANT CHANGE UP (ref 7–13)
POTASSIUM SERPL-MCNC: 4.1 MMOL/L — SIGNIFICANT CHANGE UP (ref 3.5–5.3)
POTASSIUM SERPL-SCNC: 4.1 MMOL/L — SIGNIFICANT CHANGE UP (ref 3.5–5.3)
PROT SERPL-MCNC: 6.5 G/DL — SIGNIFICANT CHANGE UP (ref 6–8.3)
PROTHROM AB SERPL-ACNC: 11.2 SEC — SIGNIFICANT CHANGE UP (ref 9.8–13.1)
RBC # BLD: 3.83 M/UL — SIGNIFICANT CHANGE UP (ref 3.8–5.2)
RBC # FLD: 12.6 % — SIGNIFICANT CHANGE UP (ref 10.3–14.5)
RH IG SCN BLD-IMP: POSITIVE — SIGNIFICANT CHANGE UP
SODIUM SERPL-SCNC: 137 MMOL/L — SIGNIFICANT CHANGE UP (ref 135–145)
WBC # BLD: 6.23 K/UL — SIGNIFICANT CHANGE UP (ref 3.8–10.5)
WBC # FLD AUTO: 6.23 K/UL — SIGNIFICANT CHANGE UP (ref 3.8–10.5)

## 2018-05-30 PROCEDURE — 93306 TTE W/DOPPLER COMPLETE: CPT | Mod: 26

## 2018-05-30 PROCEDURE — 71045 X-RAY EXAM CHEST 1 VIEW: CPT | Mod: 26

## 2018-05-30 RX ORDER — CEFAZOLIN SODIUM 1 G
1000 VIAL (EA) INJECTION EVERY 8 HOURS
Qty: 0 | Refills: 0 | Status: DISCONTINUED | OUTPATIENT
Start: 2018-05-30 | End: 2018-05-30

## 2018-05-30 RX ORDER — CEFAZOLIN SODIUM 1 G
VIAL (EA) INJECTION
Qty: 0 | Refills: 0 | Status: DISCONTINUED | OUTPATIENT
Start: 2018-05-30 | End: 2018-05-30

## 2018-05-30 RX ORDER — CEFAZOLIN SODIUM 1 G
1000 VIAL (EA) INJECTION ONCE
Qty: 0 | Refills: 0 | Status: COMPLETED | OUTPATIENT
Start: 2018-05-30 | End: 2018-05-30

## 2018-05-30 RX ADMIN — Medication 100 MILLIGRAM(S): at 09:31

## 2018-05-30 RX ADMIN — OXYCODONE AND ACETAMINOPHEN 2 TABLET(S): 5; 325 TABLET ORAL at 11:08

## 2018-05-30 RX ADMIN — Medication 50 MICROGRAM(S): at 05:41

## 2018-05-30 RX ADMIN — Medication 300 MILLIGRAM(S): at 21:25

## 2018-05-30 RX ADMIN — OXYCODONE AND ACETAMINOPHEN 2 TABLET(S): 5; 325 TABLET ORAL at 21:24

## 2018-05-30 RX ADMIN — OXYCODONE AND ACETAMINOPHEN 2 TABLET(S): 5; 325 TABLET ORAL at 21:54

## 2018-05-30 RX ADMIN — OXYCODONE AND ACETAMINOPHEN 1 TABLET(S): 5; 325 TABLET ORAL at 01:48

## 2018-05-30 RX ADMIN — OXYCODONE AND ACETAMINOPHEN 2 TABLET(S): 5; 325 TABLET ORAL at 12:10

## 2018-05-30 RX ADMIN — Medication 300 MILLIGRAM(S): at 14:27

## 2018-05-30 RX ADMIN — OXYCODONE AND ACETAMINOPHEN 1 TABLET(S): 5; 325 TABLET ORAL at 01:18

## 2018-05-30 NOTE — PROGRESS NOTE ADULT - SUBJECTIVE AND OBJECTIVE BOX
Patient is a 37y old  Female who presents with a chief complaint of Ankle pain (29 May 2018 16:21)       INTERVAL HPI/OVERNIGHT EVENTS:  Patient seen and evaluated at bedside.  Pt is resting comfortable in NAD. Denies N/V/F/C.      Allergies    No Known Allergies    Intolerances        Vital Signs Last 24 Hrs  T(C): 36.4 (30 May 2018 05:39), Max: 36.8 (29 May 2018 10:55)  T(F): 97.6 (30 May 2018 05:39), Max: 98.2 (29 May 2018 10:55)  HR: 63 (30 May 2018 05:39) (63 - 89)  BP: 117/72 (30 May 2018 05:39) (117/72 - 149/87)  BP(mean): --  RR: 18 (30 May 2018 05:39) (16 - 18)  SpO2: 100% (30 May 2018 05:39) (100% - 100%)    LABS:                        11.6   6.23  )-----------( 208      ( 30 May 2018 05:51 )             34.4     05-30    137  |  99  |  10  ----------------------------<  103<H>  4.1   |  24  |  0.73    Ca    9.2      30 May 2018 05:51    TPro  6.5  /  Alb  3.8  /  TBili  0.5  /  DBili  x   /  AST  23  /  ALT  12  /  AlkPhos  84  05-30    PT/INR - ( 30 May 2018 05:51 )   PT: 11.2 SEC;   INR: 0.97          PTT - ( 30 May 2018 05:51 )  PTT:31.1 SEC    CAPILLARY BLOOD GLUCOSE          Lower Extremity Physical Exam:  AO splint left clean dry intact today, good CFT to toes w/ sensation intact    RADIOLOGY & ADDITIONAL TESTS:  < from: Xray Ankle Complete 3 Views, Right (05.29.18 @ 15:14) >    EXAM:  RAD ANKLE MIN 3 VIEWS RIGHT      EXAM:  RAD FOOT MIN 3 VIEWS RIGHT        PROCEDURE DATE:  May 29 2018         INTERPRETATION:  CLINICAL INDICATION: closed reduction of right ankle   fracture    EXAM:  Frontal, oblique, and lateral right ankle and foot from 5/29/2018 at   1514. Compared to prior study from 5/26/2018.    IMPRESSION:  Interval splint application.    Redemonstrated displaced distal fibular and medial malleolar fractures   however with improved alignment compared to the prereduction images.    Widened anteromedial tibiotalar joint.    Normally aligned remaining articulations. No additional fractures.     Tarsometatarsal alignment maintained without evidence for a Lisfranc   injury.    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 Preserved remaining joint spaces and no   joint margin erosions.                  CARMELO KEATING M.D., ATTENDING RADIOLOGIST  This document has been electronically signed. May 29 2018  5:43PM                  < end of copied text >

## 2018-05-30 NOTE — CHART NOTE - NSCHARTNOTEFT_GEN_A_CORE
Notified by RN pt was having pain at IV site when Ancef was running within a minute. Ancef held. IV flushed with no problems or pain. Accessed pt by bedside. Tenderness to IV site upon palpation. Pulses palpable. VSS. Pt denies any chest pain, shortness of breath, abdominal pain. Will D/C Ancef for now.     ADS  11203 Notified by RN pt was having pain at IV site when Ancef was running within a minute. Ancef held. IV flushed with no problems or pain. Accessed pt by bedside. Tenderness to IV site upon palpation. Pulses palpable. VSS. Pt denies any chest pain, shortness of breath, abdominal pain. Will D/C Ancef for now and apply compresses to area. Spoke with attending, will change to Clindamycin.    ADS  [id number]

## 2018-05-30 NOTE — PROGRESS NOTE ADULT - ASSESSMENT
38yo F w/ R ankle fx s/p closed reduction (5/26) of trimalleolar equivalent w/ possible small avulsion PTFL (presumed ruptured 2/2 mechanism), admitted for pain control and surgical intervention  ·	Pt seen and evaluated  ·	AO splint left clean dry intact, pain improving per pt  ·	Pt to be NWB to RLE at all times  ·	Plan for OR tomorrow (Thursday) or Friday for external fixation likely delta frame w/ or w/o possible internal fixation based on soft tissue envelope  ·	Please document medical optimization for OR  ·	Long discussion with pt regarding surgical correction  ·	Pt & family understands  ·	Please keep on Ancef at this time due to drainage of serous lateral blister  ·	Consent obtained and witnessed   ·	d/w attending 38yo F w/ R ankle fx s/p closed reduction (5/26) of trimalleolar equivalent w/ possible small avulsion PTFL (presumed ruptured 2/2 mechanism), admitted for pain control and surgical intervention  ·	Pt seen and evaluated  ·	AO splint left clean dry intact, pain improving per pt  ·	Pt to be NWB to RLE at all times  ·	Plan for OR tomorrow (Thursday) or Friday for external fixation likely delta frame w/ or w/o possible internal fixation based on soft tissue envelope  ·	Please document medical optimization for OR  ·	Long discussion with pt regarding surgical correction, planning to stage procedure with delta frame followed by eventual ORIF upon resolution of soft tissue fracture blisters and swelling  ·	Pt & family understands  ·	Please keep on Ancef at this time due to drainage of serous lateral blister  ·	Consent obtained and witnessed   ·	d/w attending

## 2018-05-30 NOTE — PROVIDER CONTACT NOTE (OTHER) - SITUATION
Brand new Iv, flushed well & tolerated. Redness and pain noted when antibiotic started. Redness and pain stopped when antibiotic held.

## 2018-05-30 NOTE — PROGRESS NOTE ADULT - ASSESSMENT
37-yo Female with hx of Hypothyroidism being admitted for pain control of fractured R ankle with possibility for surgical intervention.

## 2018-05-30 NOTE — PROGRESS NOTE ADULT - PROBLEM SELECTOR PLAN 1
c/w pain control with percocet. Appreciate Podiatry recommendations. TTE ordered as pt reports a hx of rheumatic fever as child, although I do not appreciate any murmurs at bedside at all.

## 2018-05-30 NOTE — PROVIDER CONTACT NOTE (OTHER) - ACTION/TREATMENT ORDERED:
IV assessed and flushed by ADAM Brooks. IV antibiotic discontinued and new oral antibiotic initiated.

## 2018-05-30 NOTE — PROGRESS NOTE ADULT - SUBJECTIVE AND OBJECTIVE BOX
Pain rt distal lower extremity; denies any CP or SOB    Vital Signs Last 24 Hrs  T(C): 36.4 (30 May 2018 05:39), Max: 36.8 (29 May 2018 19:43)  T(F): 97.6 (30 May 2018 05:39), Max: 98.2 (29 May 2018 19:43)  HR: 63 (30 May 2018 05:39) (63 - 66)  BP: 117/72 (30 May 2018 05:39) (117/72 - 149/70)  BP(mean): --  RR: 18 (30 May 2018 05:39) (16 - 18)  SpO2: 100% (30 May 2018 05:39) (100% - 100%)    General: uncomfortable 2/2 pain, non-toxic appearing, speaking in full sentences   HEENT: EOMI, PERRLA, no conjunctival pallor, MMM, no JVD, no thyromegaly, neck supple, trachea midline  CV: S1S2 RRR no MRG  Lungs: CTA BL  Abdomen: soft NTND +BS   Extremities: No CCE +WWP, (+) Right ankle wrapped in ace bandages   Skin/MSK: No rashes, preserved ROM on active & passive movement  Neuro: AAOx3, no focal deficits (5/5 strength all extremities), no sensory deficits    LABS:                        11.6   6.23  )-----------( 208      ( 30 May 2018 05:51 )             34.4     05-30    137  |  99  |  10  ----------------------------<  103<H>  4.1   |  24  |  0.73    Ca    9.2      30 May 2018 05:51    TPro  6.5  /  Alb  3.8  /  TBili  0.5  /  DBili  x   /  AST  23  /  ALT  12  /  AlkPhos  84  05-30    PT/INR - ( 30 May 2018 05:51 )   PT: 11.2 SEC;   INR: 0.97          PTT - ( 30 May 2018 05:51 )  PTT:31.1 SEC  CAPILLARY BLOOD GLUCOSE

## 2018-05-31 LAB
APTT BLD: 31 SEC — SIGNIFICANT CHANGE UP (ref 27.5–37.4)
BUN SERPL-MCNC: 11 MG/DL — SIGNIFICANT CHANGE UP (ref 7–23)
CALCIUM SERPL-MCNC: 9.5 MG/DL — SIGNIFICANT CHANGE UP (ref 8.4–10.5)
CHLORIDE SERPL-SCNC: 98 MMOL/L — SIGNIFICANT CHANGE UP (ref 98–107)
CO2 SERPL-SCNC: 24 MMOL/L — SIGNIFICANT CHANGE UP (ref 22–31)
CREAT SERPL-MCNC: 0.81 MG/DL — SIGNIFICANT CHANGE UP (ref 0.5–1.3)
GLUCOSE SERPL-MCNC: 100 MG/DL — HIGH (ref 70–99)
HCT VFR BLD CALC: 36.9 % — SIGNIFICANT CHANGE UP (ref 34.5–45)
HGB BLD-MCNC: 12.2 G/DL — SIGNIFICANT CHANGE UP (ref 11.5–15.5)
INR BLD: 1.04 — SIGNIFICANT CHANGE UP (ref 0.88–1.17)
MCHC RBC-ENTMCNC: 29.5 PG — SIGNIFICANT CHANGE UP (ref 27–34)
MCHC RBC-ENTMCNC: 33.1 % — SIGNIFICANT CHANGE UP (ref 32–36)
MCV RBC AUTO: 89.3 FL — SIGNIFICANT CHANGE UP (ref 80–100)
NRBC # FLD: 0 — SIGNIFICANT CHANGE UP
PLATELET # BLD AUTO: 219 K/UL — SIGNIFICANT CHANGE UP (ref 150–400)
PMV BLD: 12.4 FL — SIGNIFICANT CHANGE UP (ref 7–13)
POTASSIUM SERPL-MCNC: 4.2 MMOL/L — SIGNIFICANT CHANGE UP (ref 3.5–5.3)
POTASSIUM SERPL-SCNC: 4.2 MMOL/L — SIGNIFICANT CHANGE UP (ref 3.5–5.3)
PROTHROM AB SERPL-ACNC: 11.6 SEC — SIGNIFICANT CHANGE UP (ref 9.8–13.1)
RBC # BLD: 4.13 M/UL — SIGNIFICANT CHANGE UP (ref 3.8–5.2)
RBC # FLD: 12.5 % — SIGNIFICANT CHANGE UP (ref 10.3–14.5)
RH IG SCN BLD-IMP: POSITIVE — SIGNIFICANT CHANGE UP
SODIUM SERPL-SCNC: 136 MMOL/L — SIGNIFICANT CHANGE UP (ref 135–145)
WBC # BLD: 6.37 K/UL — SIGNIFICANT CHANGE UP (ref 3.8–10.5)
WBC # FLD AUTO: 6.37 K/UL — SIGNIFICANT CHANGE UP (ref 3.8–10.5)

## 2018-05-31 RX ORDER — ACETAMINOPHEN WITH CODEINE 300MG-30MG
2 TABLET ORAL EVERY 4 HOURS
Qty: 0 | Refills: 0 | Status: DISCONTINUED | OUTPATIENT
Start: 2018-05-31 | End: 2018-06-02

## 2018-05-31 RX ORDER — HEPARIN SODIUM 5000 [USP'U]/ML
5000 INJECTION INTRAVENOUS; SUBCUTANEOUS EVERY 8 HOURS
Qty: 0 | Refills: 0 | Status: DISCONTINUED | OUTPATIENT
Start: 2018-05-31 | End: 2018-06-11

## 2018-05-31 RX ORDER — OXYCODONE AND ACETAMINOPHEN 5; 325 MG/1; MG/1
1 TABLET ORAL EVERY 4 HOURS
Qty: 0 | Refills: 0 | Status: DISCONTINUED | OUTPATIENT
Start: 2018-05-31 | End: 2018-05-31

## 2018-05-31 RX ORDER — SODIUM CHLORIDE 9 MG/ML
1000 INJECTION INTRAMUSCULAR; INTRAVENOUS; SUBCUTANEOUS
Qty: 0 | Refills: 0 | Status: DISCONTINUED | OUTPATIENT
Start: 2018-05-31 | End: 2018-06-01

## 2018-05-31 RX ORDER — MORPHINE SULFATE 50 MG/1
2 CAPSULE, EXTENDED RELEASE ORAL EVERY 4 HOURS
Qty: 0 | Refills: 0 | Status: DISCONTINUED | OUTPATIENT
Start: 2018-05-31 | End: 2018-06-02

## 2018-05-31 RX ORDER — OXYCODONE AND ACETAMINOPHEN 5; 325 MG/1; MG/1
1 TABLET ORAL EVERY 6 HOURS
Qty: 0 | Refills: 0 | Status: DISCONTINUED | OUTPATIENT
Start: 2018-05-31 | End: 2018-06-02

## 2018-05-31 RX ADMIN — Medication 300 MILLIGRAM(S): at 22:00

## 2018-05-31 RX ADMIN — Medication 50 MICROGRAM(S): at 05:04

## 2018-05-31 RX ADMIN — HEPARIN SODIUM 5000 UNIT(S): 5000 INJECTION INTRAVENOUS; SUBCUTANEOUS at 22:00

## 2018-05-31 RX ADMIN — Medication 300 MILLIGRAM(S): at 05:04

## 2018-05-31 NOTE — PROGRESS NOTE ADULT - ASSESSMENT
Plan:   Pt is scheduled for RIGHT ankle Delta Frame Application w/ poss ORIF of Ankle Fracture with Dr. Sellers at 1230  CXR on sunrise.  EKG on sunrise.  Medical clearance since 5/30 and documented in chart.  Consent signed and in chart.  Procedure was explained to patient in detail. All alternatives, risks and complications were discussed. All questions answered.

## 2018-05-31 NOTE — PROGRESS NOTE ADULT - SUBJECTIVE AND OBJECTIVE BOX
Patient is a 37y old  Female who presents with a chief complaint of Ankle pain (29 May 2018 16:21)    INTERVAL HPI/OVERNIGHT EVENTS:   Pt is scheduled for RIGHT ankle Delta Frame Application w/ poss ORIF of Ankle Fracture with Dr. Sellers at 1230. Patient is aware of procedure and is NPO since midnight.    MEDICATIONS  (STANDING):  clindamycin   Capsule 300 milliGRAM(s) Oral every 8 hours  levothyroxine 50 MICROGram(s) Oral daily    MEDICATIONS  (PRN):  ondansetron Injectable 4 milliGRAM(s) IV Push every 6 hours PRN Nausea and/or Vomiting  oxyCODONE    5 mG/acetaminophen 325 mG 2 Tablet(s) Oral every 4 hours PRN Severe Pain (7 - 10)  oxyCODONE    5 mG/acetaminophen 325 mG 1 Tablet(s) Oral every 4 hours PRN Moderate Pain (4 - 6)    Allergies    No Known Allergies    Intolerances    Vital Signs Last 24 Hrs  T(C): 36.7 (31 May 2018 04:55), Max: 37.1 (30 May 2018 20:20)  T(F): 98.1 (31 May 2018 04:55), Max: 98.8 (30 May 2018 20:20)  HR: 59 (31 May 2018 04:55) (59 - 65)  BP: 132/77 (31 May 2018 04:55) (123/68 - 132/77)  BP(mean): --  RR: 17 (31 May 2018 04:55) (17 - 18)  SpO2: 100% (31 May 2018 04:55) (99% - 100%)    LABS:                        12.2   6.37  )-----------( 219      ( 31 May 2018 05:40 )             36.9     05-31    136  |  98  |  11  ----------------------------<  100<H>  4.2   |  24  |  0.81    Ca    9.5      31 May 2018 05:40    TPro  6.5  /  Alb  3.8  /  TBili  0.5  /  DBili  x   /  AST  23  /  ALT  12  /  AlkPhos  84  05-30    PT/INR - ( 31 May 2018 05:40 )   PT: 11.6 SEC;   INR: 1.04       PTT - ( 31 May 2018 05:40 )  PTT:31.0 SEC    CAPILLARY BLOOD GLUCOSE    RADIOLOGY & ADDITIONAL TESTS:

## 2018-05-31 NOTE — BRIEF OPERATIVE NOTE - PROCEDURE
<<-----Click on this checkbox to enter Procedure Closed reduction of ankle fracture  05/31/2018  close reduction of right ankle fracture with delta frame  Active  [facility]

## 2018-06-01 LAB
BUN SERPL-MCNC: 15 MG/DL — SIGNIFICANT CHANGE UP (ref 7–23)
CALCIUM SERPL-MCNC: 9.5 MG/DL — SIGNIFICANT CHANGE UP (ref 8.4–10.5)
CHLORIDE SERPL-SCNC: 99 MMOL/L — SIGNIFICANT CHANGE UP (ref 98–107)
CO2 SERPL-SCNC: 22 MMOL/L — SIGNIFICANT CHANGE UP (ref 22–31)
CREAT SERPL-MCNC: 0.73 MG/DL — SIGNIFICANT CHANGE UP (ref 0.5–1.3)
GLUCOSE SERPL-MCNC: 134 MG/DL — HIGH (ref 70–99)
HCT VFR BLD CALC: 35.1 % — SIGNIFICANT CHANGE UP (ref 34.5–45)
HGB BLD-MCNC: 11.5 G/DL — SIGNIFICANT CHANGE UP (ref 11.5–15.5)
MCHC RBC-ENTMCNC: 29.6 PG — SIGNIFICANT CHANGE UP (ref 27–34)
MCHC RBC-ENTMCNC: 32.8 % — SIGNIFICANT CHANGE UP (ref 32–36)
MCV RBC AUTO: 90.5 FL — SIGNIFICANT CHANGE UP (ref 80–100)
NRBC # FLD: 0 — SIGNIFICANT CHANGE UP
PLATELET # BLD AUTO: 253 K/UL — SIGNIFICANT CHANGE UP (ref 150–400)
PMV BLD: 12.8 FL — SIGNIFICANT CHANGE UP (ref 7–13)
POTASSIUM SERPL-MCNC: 5.1 MMOL/L — SIGNIFICANT CHANGE UP (ref 3.5–5.3)
POTASSIUM SERPL-SCNC: 5.1 MMOL/L — SIGNIFICANT CHANGE UP (ref 3.5–5.3)
RBC # BLD: 3.88 M/UL — SIGNIFICANT CHANGE UP (ref 3.8–5.2)
RBC # FLD: 12.4 % — SIGNIFICANT CHANGE UP (ref 10.3–14.5)
SODIUM SERPL-SCNC: 136 MMOL/L — SIGNIFICANT CHANGE UP (ref 135–145)
WBC # BLD: 9.61 K/UL — SIGNIFICANT CHANGE UP (ref 3.8–10.5)
WBC # FLD AUTO: 9.61 K/UL — SIGNIFICANT CHANGE UP (ref 3.8–10.5)

## 2018-06-01 RX ORDER — LACTOBACILLUS ACIDOPHILUS 100MM CELL
1 CAPSULE ORAL
Qty: 0 | Refills: 0 | Status: DISCONTINUED | OUTPATIENT
Start: 2018-06-01 | End: 2018-06-11

## 2018-06-01 RX ORDER — IBUPROFEN 200 MG
400 TABLET ORAL ONCE
Qty: 0 | Refills: 0 | Status: COMPLETED | OUTPATIENT
Start: 2018-06-01 | End: 2018-06-01

## 2018-06-01 RX ADMIN — HEPARIN SODIUM 5000 UNIT(S): 5000 INJECTION INTRAVENOUS; SUBCUTANEOUS at 21:42

## 2018-06-01 RX ADMIN — Medication 300 MILLIGRAM(S): at 06:11

## 2018-06-01 RX ADMIN — Medication 400 MILLIGRAM(S): at 13:12

## 2018-06-01 RX ADMIN — OXYCODONE AND ACETAMINOPHEN 1 TABLET(S): 5; 325 TABLET ORAL at 09:25

## 2018-06-01 RX ADMIN — Medication 1 TABLET(S): at 18:07

## 2018-06-01 RX ADMIN — Medication 50 MICROGRAM(S): at 06:11

## 2018-06-01 RX ADMIN — HEPARIN SODIUM 5000 UNIT(S): 5000 INJECTION INTRAVENOUS; SUBCUTANEOUS at 06:14

## 2018-06-01 RX ADMIN — Medication 300 MILLIGRAM(S): at 21:42

## 2018-06-01 RX ADMIN — OXYCODONE AND ACETAMINOPHEN 1 TABLET(S): 5; 325 TABLET ORAL at 08:46

## 2018-06-01 RX ADMIN — Medication 400 MILLIGRAM(S): at 12:17

## 2018-06-01 RX ADMIN — Medication 300 MILLIGRAM(S): at 12:19

## 2018-06-01 RX ADMIN — HEPARIN SODIUM 5000 UNIT(S): 5000 INJECTION INTRAVENOUS; SUBCUTANEOUS at 12:19

## 2018-06-01 NOTE — PROGRESS NOTE ADULT - PROBLEM SELECTOR PLAN 1
c/w pain control with percocet/ MSO4 prn  Incentive spirometry  PT/OT  appreciate podiatry intervention

## 2018-06-01 NOTE — PROGRESS NOTE ADULT - SUBJECTIVE AND OBJECTIVE BOX
Patient is a 37y old  Female who presents with a chief complaint of Ankle pain (29 May 2018 16:21)      INTERVAL HPI/OVERNIGHT EVENTS: No pain still numb from pop block.     MEDICATIONS  (STANDING):  clindamycin   Capsule 300 milliGRAM(s) Oral every 8 hours  heparin  Injectable 5000 Unit(s) SubCutaneous every 8 hours  levothyroxine 50 MICROGram(s) Oral daily    MEDICATIONS  (PRN):  acetaminophen 300 mG/codeine 30 mG 2 Tablet(s) Oral every 4 hours PRN Mild Pain (1 - 3)  morphine  - Injectable 2 milliGRAM(s) IV Push every 4 hours PRN Severe Pain (7 - 10)  ondansetron Injectable 4 milliGRAM(s) IV Push every 6 hours PRN Nausea and/or Vomiting  oxyCODONE    5 mG/acetaminophen 325 mG 1 Tablet(s) Oral every 6 hours PRN Moderate Pain (4 - 6)      Allergies    No Known Allergies    Intolerances    Vital Signs Last 24 Hrs  T(C): 36.1 (01 Jun 2018 06:08), Max: 36.9 (31 May 2018 11:45)  T(F): 96.9 (01 Jun 2018 06:08), Max: 98.4 (31 May 2018 11:45)  HR: 93 (01 Jun 2018 06:08) (63 - 93)  BP: 108/63 (01 Jun 2018 06:08) (108/63 - 129/77)  BP(mean): --  RR: 16 (01 Jun 2018 06:08) (10 - 19)  SpO2: 100% (01 Jun 2018 06:08) (98% - 100%)    CAMILLE: Right ankle pin site clean, sutures intact, ankle wounds healing no sign of infection. Pins wires intact. Swelling improving.     I&O's Detail    31 May 2018 07:01  -  01 Jun 2018 07:00  --------------------------------------------------------  IN:    Oral Fluid: 200 mL    sodium chloride 0.9%: 150 mL  Total IN: 350 mL    OUT:  Total OUT: 0 mL    Total NET: 350 mL          LABS:                        11.5   9.61  )-----------( 253      ( 01 Jun 2018 05:57 )             35.1     06-01    136  |  99  |  15  ----------------------------<  134<H>  5.1   |  22  |  0.73    Ca    9.5      01 Jun 2018 05:57      PT/INR - ( 31 May 2018 05:40 )   PT: 11.6 SEC;   INR: 1.04          PTT - ( 31 May 2018 05:40 )  PTT:31.0 SEC    CAPILLARY BLOOD GLUCOSE          RADIOLOGY & ADDITIONAL TESTS:    Imaging Personally Reviewed:  [ ] YES  [ ] NO    Consultant(s) Notes Reviewed:  [ ] YES  [ ] NO    Care Discussed with Consultants/Other Providers [ ] YES  [ ] NO    Prophylactic measure  Hypothyroid  Ankle fracture, right, sequela

## 2018-06-01 NOTE — PHYSICAL THERAPY INITIAL EVALUATION ADULT - ACTIVE RANGE OF MOTION EXAMINATION, REHAB EVAL
bilateral upper extremity Active ROM was WFL (within functional limits)/except NT on R ankle s/p ORIF/bilateral  lower extremity Active ROM was WFL (within functional limits)

## 2018-06-01 NOTE — PROGRESS NOTE ADULT - SUBJECTIVE AND OBJECTIVE BOX
No worsening of pain in rt LE    Vital Signs Last 24 Hrs  T(C): 36.7 (01 Jun 2018 12:40), Max: 36.8 (31 May 2018 15:35)  T(F): 98 (01 Jun 2018 12:40), Max: 98.2 (31 May 2018 15:35)  HR: 68 (01 Jun 2018 12:40) (68 - 93)  BP: 118/69 (01 Jun 2018 12:40) (108/63 - 129/77)  BP(mean): --  RR: 16 (01 Jun 2018 12:40) (10 - 19)  SpO2: 100% (01 Jun 2018 12:40) (98% - 100%)    General: more comfortable  HEENT: EOMI, NCAT  Neck: no JVD  CV: S1S2 RRR no MRG  Lungs: CTA BL  Abdomen: soft NTND +BS   Extremities: No CCE +WWP, (+) Right ankle wrapped in ace bandages, pin sites c/d/i  Skin/MSK: No rashes, preserved ROM on active & passive movement  Neuro: AAOx3, no focal deficits (5/5 strength all extremities), no sensory deficits    LABS:                        11.5   9.61  )-----------( 253      ( 01 Jun 2018 05:57 )             35.1     06-01    136  |  99  |  15  ----------------------------<  134<H>  5.1   |  22  |  0.73    Ca    9.5      01 Jun 2018 05:57      PT/INR - ( 31 May 2018 05:40 )   PT: 11.6 SEC;   INR: 1.04          PTT - ( 31 May 2018 05:40 )  PTT:31.0 SEC  CAPILLARY BLOOD GLUCOSE

## 2018-06-01 NOTE — PROGRESS NOTE ADULT - SUBJECTIVE AND OBJECTIVE BOX
ANESTHESIA POSTOP CHECK    37y Female POSTOP DAY 1     Vital Signs Last 24 Hrs  T(C): 36.7 (01 Jun 2018 12:40), Max: 36.8 (31 May 2018 15:35)  T(F): 98 (01 Jun 2018 12:40), Max: 98.2 (31 May 2018 15:35)  HR: 68 (01 Jun 2018 12:40) (68 - 93)  BP: 118/69 (01 Jun 2018 12:40) (108/63 - 129/77)  BP(mean): --  RR: 16 (01 Jun 2018 12:40) (10 - 19)  SpO2: 100% (01 Jun 2018 12:40) (98% - 100%)  I&O's Summary    31 May 2018 07:01  -  01 Jun 2018 07:00  --------------------------------------------------------  IN: 350 mL / OUT: 0 mL / NET: 350 mL        [X ] NO APPARENT ANESTHESIA COMPLICATIONS      Comments:  Patient reports right leg still numb from block.

## 2018-06-01 NOTE — PROGRESS NOTE ADULT - PROBLEM SELECTOR PLAN 1
-fu PT recommendation for dc   -Nonweight bear to right ankle   -keep dressing clean dry and intact  -Plan ORIF once ankle wounds heal  -fu Pain level  -Ankle pin sites cleansed with betadine, SSD cream applied to ankle wounds and covered with xeroform, 4x4 gauze and cora and ace bandage.   -discussed with Dr. Sellers

## 2018-06-01 NOTE — PHYSICAL THERAPY INITIAL EVALUATION ADULT - PERTINENT HX OF CURRENT PROBLEM, REHAB EVAL
This is a 37F hx of Hypothyroidism being admitted for pain control of fractured R ankle is now s/p ORIF R ankle on 5/31/18.

## 2018-06-02 ENCOUNTER — TRANSCRIPTION ENCOUNTER (OUTPATIENT)
Age: 38
End: 2018-06-02

## 2018-06-02 RX ORDER — MORPHINE SULFATE 50 MG/1
4 CAPSULE, EXTENDED RELEASE ORAL EVERY 4 HOURS
Qty: 0 | Refills: 0 | Status: DISCONTINUED | OUTPATIENT
Start: 2018-06-02 | End: 2018-06-07

## 2018-06-02 RX ORDER — MORPHINE SULFATE 50 MG/1
2 CAPSULE, EXTENDED RELEASE ORAL ONCE
Qty: 0 | Refills: 0 | Status: DISCONTINUED | OUTPATIENT
Start: 2018-06-02 | End: 2018-06-02

## 2018-06-02 RX ORDER — MORPHINE SULFATE 50 MG/1
2 CAPSULE, EXTENDED RELEASE ORAL EVERY 4 HOURS
Qty: 0 | Refills: 0 | Status: DISCONTINUED | OUTPATIENT
Start: 2018-06-02 | End: 2018-06-07

## 2018-06-02 RX ORDER — KETOROLAC TROMETHAMINE 30 MG/ML
30 SYRINGE (ML) INJECTION ONCE
Qty: 0 | Refills: 0 | Status: DISCONTINUED | OUTPATIENT
Start: 2018-06-02 | End: 2018-06-02

## 2018-06-02 RX ORDER — OXYCODONE HYDROCHLORIDE 5 MG/1
10 TABLET ORAL EVERY 12 HOURS
Qty: 0 | Refills: 0 | Status: DISCONTINUED | OUTPATIENT
Start: 2018-06-02 | End: 2018-06-07

## 2018-06-02 RX ADMIN — Medication 30 MILLIGRAM(S): at 22:16

## 2018-06-02 RX ADMIN — HEPARIN SODIUM 5000 UNIT(S): 5000 INJECTION INTRAVENOUS; SUBCUTANEOUS at 13:20

## 2018-06-02 RX ADMIN — OXYCODONE AND ACETAMINOPHEN 1 TABLET(S): 5; 325 TABLET ORAL at 11:20

## 2018-06-02 RX ADMIN — Medication 1 TABLET(S): at 17:22

## 2018-06-02 RX ADMIN — Medication 300 MILLIGRAM(S): at 13:20

## 2018-06-02 RX ADMIN — MORPHINE SULFATE 4 MILLIGRAM(S): 50 CAPSULE, EXTENDED RELEASE ORAL at 19:55

## 2018-06-02 RX ADMIN — Medication 300 MILLIGRAM(S): at 05:51

## 2018-06-02 RX ADMIN — Medication 30 MILLIGRAM(S): at 23:00

## 2018-06-02 RX ADMIN — MORPHINE SULFATE 2 MILLIGRAM(S): 50 CAPSULE, EXTENDED RELEASE ORAL at 11:31

## 2018-06-02 RX ADMIN — MORPHINE SULFATE 4 MILLIGRAM(S): 50 CAPSULE, EXTENDED RELEASE ORAL at 15:43

## 2018-06-02 RX ADMIN — MORPHINE SULFATE 4 MILLIGRAM(S): 50 CAPSULE, EXTENDED RELEASE ORAL at 20:49

## 2018-06-02 RX ADMIN — HEPARIN SODIUM 5000 UNIT(S): 5000 INJECTION INTRAVENOUS; SUBCUTANEOUS at 05:50

## 2018-06-02 RX ADMIN — HEPARIN SODIUM 5000 UNIT(S): 5000 INJECTION INTRAVENOUS; SUBCUTANEOUS at 22:16

## 2018-06-02 RX ADMIN — MORPHINE SULFATE 4 MILLIGRAM(S): 50 CAPSULE, EXTENDED RELEASE ORAL at 16:00

## 2018-06-02 RX ADMIN — Medication 50 MICROGRAM(S): at 05:51

## 2018-06-02 RX ADMIN — MORPHINE SULFATE 2 MILLIGRAM(S): 50 CAPSULE, EXTENDED RELEASE ORAL at 11:50

## 2018-06-02 RX ADMIN — OXYCODONE AND ACETAMINOPHEN 1 TABLET(S): 5; 325 TABLET ORAL at 03:20

## 2018-06-02 RX ADMIN — OXYCODONE HYDROCHLORIDE 10 MILLIGRAM(S): 5 TABLET ORAL at 18:59

## 2018-06-02 RX ADMIN — Medication 300 MILLIGRAM(S): at 22:16

## 2018-06-02 RX ADMIN — OXYCODONE AND ACETAMINOPHEN 1 TABLET(S): 5; 325 TABLET ORAL at 02:43

## 2018-06-02 RX ADMIN — OXYCODONE AND ACETAMINOPHEN 1 TABLET(S): 5; 325 TABLET ORAL at 10:45

## 2018-06-02 RX ADMIN — OXYCODONE HYDROCHLORIDE 10 MILLIGRAM(S): 5 TABLET ORAL at 19:30

## 2018-06-02 NOTE — DISCHARGE NOTE ADULT - CARE PROVIDER_API CALL
Adair Sellers ([email or web address]), Foot Surgery; [facility]  [address] [place] [address]  Phone: [phone]  Fax: [phone]    Keila Pitts), Hospitalists  [address] [place] [id number]  Phone: [phone]  Fax: [phone]

## 2018-06-02 NOTE — DISCHARGE NOTE ADULT - HOME CARE AGENCY
[facility] [phone] Nurse to visit on the day following discharge. Other appropriate services to be arranged thereafter. Please contact the home care agency at the above phone number if you have not heard from them by approximately 12 noon on the day after your hospital discharge.

## 2018-06-02 NOTE — PROGRESS NOTE ADULT - SUBJECTIVE AND OBJECTIVE BOX
Patient is a 37y old  Female who presents with a chief complaint of Ankle pain (29 May 2018 16:21)       INTERVAL HPI/OVERNIGHT EVENTS:  Patient seen and evaluated at bedside.  Pt is resting comfortable in NAD. Denies N/V/F/C.     Allergies    No Known Allergies    Intolerances        Vital Signs Last 24 Hrs  T(C): 36.6 (02 Jun 2018 10:00), Max: 36.7 (01 Jun 2018 12:40)  T(F): 97.9 (02 Jun 2018 10:00), Max: 98 (01 Jun 2018 12:40)  HR: 70 (02 Jun 2018 10:00) (60 - 73)  BP: 118/79 (02 Jun 2018 10:00) (100/62 - 118/79)  BP(mean): --  RR: 17 (02 Jun 2018 10:00) (16 - 17)  SpO2: 100% (02 Jun 2018 10:00) (100% - 100%)    LABS:                        11.5   9.61  )-----------( 253      ( 01 Jun 2018 05:57 )             35.1     06-01    136  |  99  |  15  ----------------------------<  134<H>  5.1   |  22  |  0.73    Ca    9.5      01 Jun 2018 05:57          CAPILLARY BLOOD GLUCOSE          Lower Extremity Physical Exam:  dressing kept clean dry and intact, no strikethrough noted

## 2018-06-02 NOTE — DISCHARGE NOTE ADULT - ADDITIONAL INSTRUCTIONS
Follow up with Dr. 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 Anterior mitral leaflet prolapse.  At least mild-moderate MR, which could be underestimated. Moderate-severe AR. Normal LV systolic function. Follow up with PCP as outpatient for further management Follow up with Dr. Sellers on Friday 6/15/18 at 8:45 AM.     ECHO - doming/ thickening of the tips of the mitral valve consistent with rheumatic mitral valve disease. Anterior mitral leaflet prolapse.  At least mild-moderate MR, which could be underestimated. Moderate-severe AR. Normal LV systolic function. Follow up with PCP as outpatient for further management

## 2018-06-02 NOTE — DISCHARGE NOTE ADULT - HOSPITAL COURSE
37 F PMHx hypothyroidism presents to [facility] ED with ankle pain S/P fall down the stairs Saturday AM (5/26) and twisted her ankles. Presented to ED, diagnosed with R ankle fracutre had hematoma aspiration and closed reduction of R ankle. Despite, pt had increased swelling, pain and was forming blisters on her ankle. No fevers, chills. Seen by Podiatry in ED, performed lancing of serous blister, applied a splint. Admitted for pain control. S/P Dilaudid, Benadryl, Percocet.     Hospital Course    Ankle fracture, right, sequela- Pain control, NWB to R foot. Podiatry Consulted - possible small avulsion PTFL (presumed rupture 2/2 mechanism - serous blister lanced, Webril/ACE applied, sugar tong splint applied). Ancef d'kellee and changed to Clindamycin on 5/30 till 6/8. PT - NWB to R foot. 6/8 s/p R ankle removal of ex-fix and ORIF, Splint left intact. Out pt f/u with Pod Dr. Sellers. PT recs home w/PT    Hypothyroid- on Synthroid    Additional- ECHO - doming/ thickening of the tips of the mitral valve consistent with rheumatic mitral valve disease. Anterior mitral leaflet prolapse.  At least mild-moderate MR, which could be underestimated. Moderate-severe AR. Normal LV systolic function.     Prophylactic measure - SQH

## 2018-06-02 NOTE — DISCHARGE NOTE ADULT - PATIENT PORTAL LINK FT
You can access the [facility] Patient Portal, offered by [facility], by registering with the following website: [email or web address]

## 2018-06-02 NOTE — PROGRESS NOTE ADULT - ASSESSMENT
37 year old female s/p R ankle reduction with delta frame    Problem/Plan - 1:  -pt seen and evaluated  -fu PT recommendations for dc   -non weightbearing to right ankle   -keep dressing clean dry and intact  -Plan ORIF once ankle wounds heal   -discussed with Dr. Sellers.

## 2018-06-02 NOTE — DISCHARGE NOTE ADULT - CARE PLAN
Goal:	fracture healing  Assessment and plan of treatment:	- keep dressing clean dry and intact  - take pain medications as needed, follow the instructions provided with the medications  - Non-weightbearing to the right foot  - follow up with Dr. Sellers within 1 week of discharge (call  to make an appointment) Principal Discharge DX:	Ankle fracture, right, sequela  Goal:	fracture healing  Assessment and plan of treatment:	- keep dressing clean dry and intact  - take pain medications as needed, follow the instructions provided with the medications  - 6/8 s/p R ankle removal of ex-fix and ORIF, Splint left intact  - Non-weightbearing to the right foot  - follow up with Dr. Sellers within 1 week of discharge (call  to make an appointment)  Secondary Diagnosis:	Pain of right lower extremity  Assessment and plan of treatment:	- Pain control, PT -- Non-weightbearing to the right foot  - 6/8 s/p R ankle removal of ex-fix and ORIF, Splint left intact  - follow up with Dr. Sellers within 1 week of discharge (call  to make an appointment)  Secondary Diagnosis:	Hypothyroidism, unspecified type  Assessment and plan of treatment:	- continue taking Synthroid as prescribed Principal Discharge DX:	Ankle fracture, right, sequela  Goal:	fracture healing  Assessment and plan of treatment:	- keep dressing clean dry and intact  - take pain medications as needed, follow the instructions provided with the medications  - 6/8 s/p R ankle removal of ex-fix and ORIF, Splint left intact  - Non-weightbearing to the right foot  - follow up with Dr. Sellers on Friday 6/15/18 at 8:45 AM  Secondary Diagnosis:	Pain of right lower extremity  Assessment and plan of treatment:	- Pain control, PT -- Non-weightbearing to the right foot  - 6/8 s/p R ankle removal of ex-fix and ORIF, Splint left intact  - follow up with Dr. Sellers on Friday 6/15/18 at 8:45 AM  Secondary Diagnosis:	Hypothyroidism, unspecified type  Assessment and plan of treatment:	- continue taking Synthroid as prescribed

## 2018-06-02 NOTE — PROGRESS NOTE ADULT - SUBJECTIVE AND OBJECTIVE BOX
no acute events    Vital Signs Last 24 Hrs  T(C): 36.4 (02 Jun 2018 05:45), Max: 36.7 (01 Jun 2018 12:40)  T(F): 97.6 (02 Jun 2018 05:45), Max: 98 (01 Jun 2018 12:40)  HR: 60 (02 Jun 2018 05:45) (60 - 73)  BP: 100/62 (02 Jun 2018 05:45) (100/62 - 118/69)  BP(mean): --  RR: 17 (02 Jun 2018 05:45) (16 - 17)  SpO2: 100% (02 Jun 2018 05:45) (100% - 100%)    General: more comfortable  HEENT: EOMI, NCAT  Neck: no JVD  CV: S1S2 RRR no MRG  Lungs: CTA BL  Abdomen: soft NTND +BS   Extremities: No CCE +WWP, (+) Right ankle wrapped in ace bandages, pin sites c/d/i  Skin/MSK: No rashes, preserved ROM on active & passive movement  Neuro: AAOx3, no focal deficits (5/5 strength all extremities), no sensory deficits    LABS:                        11.5   9.61  )-----------( 253      ( 01 Jun 2018 05:57 )             35.1     06-01    136  |  99  |  15  ----------------------------<  134<H>  5.1   |  22  |  0.73    Ca    9.5      01 Jun 2018 05:57        CAPILLARY BLOOD GLUCOSE

## 2018-06-02 NOTE — DISCHARGE NOTE ADULT - PLAN OF CARE
fracture healing - keep dressing clean dry and intact  - take pain medications as needed, follow the instructions provided with the medications  - Non-weightbearing to the right foot  - follow up with Dr. Sellers within 1 week of discharge (call  to make an appointment) - keep dressing clean dry and intact  - take pain medications as needed, follow the instructions provided with the medications  - 6/8 s/p R ankle removal of ex-fix and ORIF, Splint left intact  - Non-weightbearing to the right foot  - follow up with Dr. Sellers within 1 week of discharge (call  to make an appointment) - Pain control, PT -- Non-weightbearing to the right foot  - 6/8 s/p R ankle removal of ex-fix and ORIF, Splint left intact  - follow up with Dr. Sellers within 1 week of discharge (call  to make an appointment) - continue taking Synthroid as prescribed - keep dressing clean dry and intact  - take pain medications as needed, follow the instructions provided with the medications  - 6/8 s/p R ankle removal of ex-fix and ORIF, Splint left intact  - Non-weightbearing to the right foot  - follow up with Dr. Sellers on Friday 6/15/18 at 8:45 AM - Pain control, PT -- Non-weightbearing to the right foot  - 6/8 s/p R ankle removal of ex-fix and ORIF, Splint left intact  - follow up with Dr. Sellers on Friday 6/15/18 at 8:45 AM

## 2018-06-02 NOTE — DISCHARGE NOTE ADULT - DURABLE MEDICAL EQUIPMENT AGENCY
[facility] [phone] [facility]

## 2018-06-02 NOTE — DISCHARGE NOTE ADULT - MEDICATION SUMMARY - MEDICATIONS TO TAKE
I will START or STAY ON the medications listed below when I get home from the hospital:    acetaminophen 325 mg oral tablet  -- 2 tab(s) by mouth every 6 hours, As needed, Mild Pain (1 - 3)  -- Indication: For mild pain    oxyCODONE 10 mg oral tablet  -- 1 tab(s) by mouth every 6 hours, As Needed -Severe Pain (7 - 10) MDD:4tabs /day  -- Indication: For severe pain    nystatin 100,000 units/g topical powder  -- 1 application on skin every 12 hours  -- Indication: For skin rash    polyethylene glycol 3350 oral powder for reconstitution  -- 17 gram(s) by mouth once a day  -- Indication: For constipation    levothyroxine 50 mcg (0.05 mg) oral tablet  -- 1 tab(s) by mouth once a day  -- Indication: For Hypothyroid

## 2018-06-03 RX ORDER — POLYETHYLENE GLYCOL 3350 17 G/17G
17 POWDER, FOR SOLUTION ORAL DAILY
Qty: 0 | Refills: 0 | Status: DISCONTINUED | OUTPATIENT
Start: 2018-06-03 | End: 2018-06-11

## 2018-06-03 RX ORDER — LANOLIN ALCOHOL/MO/W.PET/CERES
3 CREAM (GRAM) TOPICAL AT BEDTIME
Qty: 0 | Refills: 0 | Status: DISCONTINUED | OUTPATIENT
Start: 2018-06-03 | End: 2018-06-11

## 2018-06-03 RX ORDER — LANOLIN ALCOHOL/MO/W.PET/CERES
3 CREAM (GRAM) TOPICAL ONCE
Qty: 0 | Refills: 0 | Status: COMPLETED | OUTPATIENT
Start: 2018-06-03 | End: 2018-06-03

## 2018-06-03 RX ORDER — LANOLIN ALCOHOL/MO/W.PET/CERES
CREAM (GRAM) TOPICAL
Qty: 0 | Refills: 0 | Status: DISCONTINUED | OUTPATIENT
Start: 2018-06-03 | End: 2018-06-11

## 2018-06-03 RX ADMIN — MORPHINE SULFATE 4 MILLIGRAM(S): 50 CAPSULE, EXTENDED RELEASE ORAL at 17:06

## 2018-06-03 RX ADMIN — MORPHINE SULFATE 4 MILLIGRAM(S): 50 CAPSULE, EXTENDED RELEASE ORAL at 12:15

## 2018-06-03 RX ADMIN — Medication 300 MILLIGRAM(S): at 06:22

## 2018-06-03 RX ADMIN — MORPHINE SULFATE 2 MILLIGRAM(S): 50 CAPSULE, EXTENDED RELEASE ORAL at 02:30

## 2018-06-03 RX ADMIN — Medication 1 TABLET(S): at 17:06

## 2018-06-03 RX ADMIN — OXYCODONE HYDROCHLORIDE 10 MILLIGRAM(S): 5 TABLET ORAL at 07:37

## 2018-06-03 RX ADMIN — MORPHINE SULFATE 4 MILLIGRAM(S): 50 CAPSULE, EXTENDED RELEASE ORAL at 21:55

## 2018-06-03 RX ADMIN — Medication 300 MILLIGRAM(S): at 12:55

## 2018-06-03 RX ADMIN — MORPHINE SULFATE 4 MILLIGRAM(S): 50 CAPSULE, EXTENDED RELEASE ORAL at 17:20

## 2018-06-03 RX ADMIN — MORPHINE SULFATE 4 MILLIGRAM(S): 50 CAPSULE, EXTENDED RELEASE ORAL at 12:01

## 2018-06-03 RX ADMIN — Medication 3 MILLIGRAM(S): at 02:44

## 2018-06-03 RX ADMIN — HEPARIN SODIUM 5000 UNIT(S): 5000 INJECTION INTRAVENOUS; SUBCUTANEOUS at 06:24

## 2018-06-03 RX ADMIN — Medication 50 MICROGRAM(S): at 06:22

## 2018-06-03 RX ADMIN — Medication 300 MILLIGRAM(S): at 21:19

## 2018-06-03 RX ADMIN — HEPARIN SODIUM 5000 UNIT(S): 5000 INJECTION INTRAVENOUS; SUBCUTANEOUS at 12:56

## 2018-06-03 RX ADMIN — MORPHINE SULFATE 2 MILLIGRAM(S): 50 CAPSULE, EXTENDED RELEASE ORAL at 01:50

## 2018-06-03 RX ADMIN — HEPARIN SODIUM 5000 UNIT(S): 5000 INJECTION INTRAVENOUS; SUBCUTANEOUS at 21:19

## 2018-06-03 RX ADMIN — OXYCODONE HYDROCHLORIDE 10 MILLIGRAM(S): 5 TABLET ORAL at 17:06

## 2018-06-03 RX ADMIN — OXYCODONE HYDROCHLORIDE 10 MILLIGRAM(S): 5 TABLET ORAL at 06:22

## 2018-06-03 RX ADMIN — MORPHINE SULFATE 4 MILLIGRAM(S): 50 CAPSULE, EXTENDED RELEASE ORAL at 21:19

## 2018-06-03 NOTE — PROGRESS NOTE ADULT - ASSESSMENT
37 year old female s/p R ankle reduction with delta frame  -pt seen and evaluated  -fu PT recommendations for dc   -non weightbearing to right ankle   -keep dressing clean dry and intact  -Plan ORIF once ankle wounds heal   -discussed with Dr. Sellers.

## 2018-06-03 NOTE — PROGRESS NOTE ADULT - PROBLEM SELECTOR PLAN 1
c/w pain control with percocet/ MSO4 prn  Incentive spirometry  PT/OT; nonweight bearing rt  appreciate podiatry intervention

## 2018-06-03 NOTE — PROGRESS NOTE ADULT - SUBJECTIVE AND OBJECTIVE BOX
Patient is a 37y old  Female who presents with a chief complaint of Ankle pain (02 Jun 2018 16:58)       INTERVAL HPI/OVERNIGHT EVENTS:  Patient seen and evaluated at bedside.  Pt is resting comfortable in NAD. Denies N/V/F/C.  Pain rated at 0/10    Allergies    No Known Allergies    Intolerances        Vital Signs Last 24 Hrs  T(C): 36.8 (03 Jun 2018 06:19), Max: 36.8 (02 Jun 2018 21:35)  T(F): 98.2 (03 Jun 2018 06:19), Max: 98.2 (02 Jun 2018 21:35)  HR: 66 (03 Jun 2018 06:19) (62 - 71)  BP: 119/71 (03 Jun 2018 06:19) (114/70 - 128/79)  BP(mean): --  RR: 17 (03 Jun 2018 06:19) (17 - 17)  SpO2: 100% (03 Jun 2018 06:19) (100% - 100%)    LABS:              CAPILLARY BLOOD GLUCOSE          Lower Extremity Physical Exam:  dressing kept clean dry and intact, no strikethrough noted    RADIOLOGY & ADDITIONAL TESTS:

## 2018-06-04 LAB
BUN SERPL-MCNC: 15 MG/DL — SIGNIFICANT CHANGE UP (ref 7–23)
CALCIUM SERPL-MCNC: 9.4 MG/DL — SIGNIFICANT CHANGE UP (ref 8.4–10.5)
CHLORIDE SERPL-SCNC: 99 MMOL/L — SIGNIFICANT CHANGE UP (ref 98–107)
CO2 SERPL-SCNC: 25 MMOL/L — SIGNIFICANT CHANGE UP (ref 22–31)
CREAT SERPL-MCNC: 0.8 MG/DL — SIGNIFICANT CHANGE UP (ref 0.5–1.3)
GLUCOSE SERPL-MCNC: 104 MG/DL — HIGH (ref 70–99)
HCT VFR BLD CALC: 34.4 % — LOW (ref 34.5–45)
HGB BLD-MCNC: 11.6 G/DL — SIGNIFICANT CHANGE UP (ref 11.5–15.5)
MCHC RBC-ENTMCNC: 29.1 PG — SIGNIFICANT CHANGE UP (ref 27–34)
MCHC RBC-ENTMCNC: 33.7 % — SIGNIFICANT CHANGE UP (ref 32–36)
MCV RBC AUTO: 86.4 FL — SIGNIFICANT CHANGE UP (ref 80–100)
NRBC # FLD: 0 — SIGNIFICANT CHANGE UP
PLATELET # BLD AUTO: 255 K/UL — SIGNIFICANT CHANGE UP (ref 150–400)
PMV BLD: 12.1 FL — SIGNIFICANT CHANGE UP (ref 7–13)
POTASSIUM SERPL-MCNC: 4.3 MMOL/L — SIGNIFICANT CHANGE UP (ref 3.5–5.3)
POTASSIUM SERPL-SCNC: 4.3 MMOL/L — SIGNIFICANT CHANGE UP (ref 3.5–5.3)
RBC # BLD: 3.98 M/UL — SIGNIFICANT CHANGE UP (ref 3.8–5.2)
RBC # FLD: 12.8 % — SIGNIFICANT CHANGE UP (ref 10.3–14.5)
SODIUM SERPL-SCNC: 137 MMOL/L — SIGNIFICANT CHANGE UP (ref 135–145)
WBC # BLD: 6.92 K/UL — SIGNIFICANT CHANGE UP (ref 3.8–10.5)
WBC # FLD AUTO: 6.92 K/UL — SIGNIFICANT CHANGE UP (ref 3.8–10.5)

## 2018-06-04 RX ADMIN — Medication 1 TABLET(S): at 18:08

## 2018-06-04 RX ADMIN — HEPARIN SODIUM 5000 UNIT(S): 5000 INJECTION INTRAVENOUS; SUBCUTANEOUS at 13:08

## 2018-06-04 RX ADMIN — MORPHINE SULFATE 4 MILLIGRAM(S): 50 CAPSULE, EXTENDED RELEASE ORAL at 13:25

## 2018-06-04 RX ADMIN — OXYCODONE HYDROCHLORIDE 10 MILLIGRAM(S): 5 TABLET ORAL at 05:55

## 2018-06-04 RX ADMIN — HEPARIN SODIUM 5000 UNIT(S): 5000 INJECTION INTRAVENOUS; SUBCUTANEOUS at 22:37

## 2018-06-04 RX ADMIN — Medication 300 MILLIGRAM(S): at 22:37

## 2018-06-04 RX ADMIN — OXYCODONE HYDROCHLORIDE 10 MILLIGRAM(S): 5 TABLET ORAL at 18:08

## 2018-06-04 RX ADMIN — Medication 300 MILLIGRAM(S): at 13:08

## 2018-06-04 RX ADMIN — Medication 300 MILLIGRAM(S): at 05:55

## 2018-06-04 RX ADMIN — HEPARIN SODIUM 5000 UNIT(S): 5000 INJECTION INTRAVENOUS; SUBCUTANEOUS at 05:55

## 2018-06-04 RX ADMIN — Medication 50 MICROGRAM(S): at 05:55

## 2018-06-04 RX ADMIN — MORPHINE SULFATE 4 MILLIGRAM(S): 50 CAPSULE, EXTENDED RELEASE ORAL at 13:08

## 2018-06-04 RX ADMIN — Medication 3 MILLIGRAM(S): at 01:44

## 2018-06-04 NOTE — PROGRESS NOTE ADULT - SUBJECTIVE AND OBJECTIVE BOX
Patient is a 37y old  Female who presents with a chief complaint of Ankle pain (02 Jun 2018 16:58)       INTERVAL HPI/OVERNIGHT EVENTS:  Patient seen and evaluated at bedside.  Pt is resting comfortable in NAD. Denies N/V/F/C.   Allergies    No Known Allergies    Intolerances        Vital Signs Last 24 Hrs  T(C): 36.4 (04 Jun 2018 05:39), Max: 36.9 (03 Jun 2018 12:14)  T(F): 97.6 (04 Jun 2018 05:39), Max: 98.4 (03 Jun 2018 12:14)  HR: 65 (04 Jun 2018 05:39) (61 - 73)  BP: 113/66 (04 Jun 2018 05:39) (113/66 - 126/76)  BP(mean): --  RR: 17 (04 Jun 2018 05:39) (17 - 18)  SpO2: 100% (04 Jun 2018 05:39) (100% - 100%)    LABS:              CAPILLARY BLOOD GLUCOSE          Lower Extremity Physical Exam:  dressing kept clean dry and intact, no strikethrough noted, CFT <3s x10    RADIOLOGY & ADDITIONAL TESTS:

## 2018-06-04 NOTE — PROGRESS NOTE ADULT - SUBJECTIVE AND OBJECTIVE BOX
pain is controlled     Vital Signs Last 24 Hrs  T(C): 36.5 (04 Jun 2018 13:44), Max: 36.8 (03 Jun 2018 22:00)  T(F): 97.7 (04 Jun 2018 13:44), Max: 98.3 (03 Jun 2018 22:00)  HR: 66 (04 Jun 2018 13:44) (61 - 73)  BP: 123/76 (04 Jun 2018 13:44) (113/66 - 124/69)  BP(mean): --  RR: 18 (04 Jun 2018 13:44) (17 - 18)  SpO2: 100% (04 Jun 2018 13:44) (100% - 100%)    General: more comfortable  HEENT: EOMI, NCAT  Neck: no JVD  CV: S1S2 RRR no MRG  Lungs: CTA BL  Abdomen: soft NTND +BS   Extremities: No CCE +WWP, (+) Right ankle wrapped in ace bandages, pin sites c/d/i  Skin: No rashes  Neuro: AAOx3, no focal deficits     LABS:                        11.6   6.92  )-----------( 255      ( 04 Jun 2018 06:12 )             34.4     06-04    137  |  99  |  15  ----------------------------<  104<H>  4.3   |  25  |  0.80    Ca    9.4      04 Jun 2018 06:12        CAPILLARY BLOOD GLUCOSE

## 2018-06-04 NOTE — PROGRESS NOTE ADULT - ASSESSMENT
37 year old female s/p R ankle reduction with delta frame  -pt seen and evaluated  -fu PT recommendations for dc   -non weightbearing to right ankle   -keep dressing clean dry and intact  -Plan ORIF once ankle wounds heal   -discussed with Dr. Sellers

## 2018-06-05 LAB
BASOPHILS # BLD AUTO: 0.04 K/UL — SIGNIFICANT CHANGE UP (ref 0–0.2)
BASOPHILS NFR BLD AUTO: 0.6 % — SIGNIFICANT CHANGE UP (ref 0–2)
BUN SERPL-MCNC: 12 MG/DL — SIGNIFICANT CHANGE UP (ref 7–23)
CALCIUM SERPL-MCNC: 9.4 MG/DL — SIGNIFICANT CHANGE UP (ref 8.4–10.5)
CHLORIDE SERPL-SCNC: 98 MMOL/L — SIGNIFICANT CHANGE UP (ref 98–107)
CO2 SERPL-SCNC: 23 MMOL/L — SIGNIFICANT CHANGE UP (ref 22–31)
CREAT SERPL-MCNC: 0.84 MG/DL — SIGNIFICANT CHANGE UP (ref 0.5–1.3)
EOSINOPHIL # BLD AUTO: 0.27 K/UL — SIGNIFICANT CHANGE UP (ref 0–0.5)
EOSINOPHIL NFR BLD AUTO: 3.9 % — SIGNIFICANT CHANGE UP (ref 0–6)
GLUCOSE SERPL-MCNC: 93 MG/DL — SIGNIFICANT CHANGE UP (ref 70–99)
HCT VFR BLD CALC: 36.8 % — SIGNIFICANT CHANGE UP (ref 34.5–45)
HGB BLD-MCNC: 12.2 G/DL — SIGNIFICANT CHANGE UP (ref 11.5–15.5)
IMM GRANULOCYTES # BLD AUTO: 0.04 # — SIGNIFICANT CHANGE UP
IMM GRANULOCYTES NFR BLD AUTO: 0.6 % — SIGNIFICANT CHANGE UP (ref 0–1.5)
LYMPHOCYTES # BLD AUTO: 2.66 K/UL — SIGNIFICANT CHANGE UP (ref 1–3.3)
LYMPHOCYTES # BLD AUTO: 37.9 % — SIGNIFICANT CHANGE UP (ref 13–44)
MCHC RBC-ENTMCNC: 29.7 PG — SIGNIFICANT CHANGE UP (ref 27–34)
MCHC RBC-ENTMCNC: 33.2 % — SIGNIFICANT CHANGE UP (ref 32–36)
MCV RBC AUTO: 89.5 FL — SIGNIFICANT CHANGE UP (ref 80–100)
MONOCYTES # BLD AUTO: 0.56 K/UL — SIGNIFICANT CHANGE UP (ref 0–0.9)
MONOCYTES NFR BLD AUTO: 8 % — SIGNIFICANT CHANGE UP (ref 2–14)
NEUTROPHILS # BLD AUTO: 3.44 K/UL — SIGNIFICANT CHANGE UP (ref 1.8–7.4)
NEUTROPHILS NFR BLD AUTO: 49 % — SIGNIFICANT CHANGE UP (ref 43–77)
NRBC # FLD: 0 — SIGNIFICANT CHANGE UP
PLATELET # BLD AUTO: 271 K/UL — SIGNIFICANT CHANGE UP (ref 150–400)
PMV BLD: 12.2 FL — SIGNIFICANT CHANGE UP (ref 7–13)
POTASSIUM SERPL-MCNC: 4.7 MMOL/L — SIGNIFICANT CHANGE UP (ref 3.5–5.3)
POTASSIUM SERPL-SCNC: 4.7 MMOL/L — SIGNIFICANT CHANGE UP (ref 3.5–5.3)
RBC # BLD: 4.11 M/UL — SIGNIFICANT CHANGE UP (ref 3.8–5.2)
RBC # FLD: 12.6 % — SIGNIFICANT CHANGE UP (ref 10.3–14.5)
SODIUM SERPL-SCNC: 136 MMOL/L — SIGNIFICANT CHANGE UP (ref 135–145)
WBC # BLD: 7.01 K/UL — SIGNIFICANT CHANGE UP (ref 3.8–10.5)
WBC # FLD AUTO: 7.01 K/UL — SIGNIFICANT CHANGE UP (ref 3.8–10.5)

## 2018-06-05 RX ORDER — NYSTATIN CREAM 100000 [USP'U]/G
1 CREAM TOPICAL EVERY 12 HOURS
Qty: 0 | Refills: 0 | Status: DISCONTINUED | OUTPATIENT
Start: 2018-06-05 | End: 2018-06-11

## 2018-06-05 RX ADMIN — Medication 1 TABLET(S): at 08:38

## 2018-06-05 RX ADMIN — Medication 300 MILLIGRAM(S): at 06:47

## 2018-06-05 RX ADMIN — OXYCODONE HYDROCHLORIDE 10 MILLIGRAM(S): 5 TABLET ORAL at 07:40

## 2018-06-05 RX ADMIN — HEPARIN SODIUM 5000 UNIT(S): 5000 INJECTION INTRAVENOUS; SUBCUTANEOUS at 22:14

## 2018-06-05 RX ADMIN — Medication 300 MILLIGRAM(S): at 22:14

## 2018-06-05 RX ADMIN — Medication 1 TABLET(S): at 17:22

## 2018-06-05 RX ADMIN — HEPARIN SODIUM 5000 UNIT(S): 5000 INJECTION INTRAVENOUS; SUBCUTANEOUS at 06:47

## 2018-06-05 RX ADMIN — MORPHINE SULFATE 2 MILLIGRAM(S): 50 CAPSULE, EXTENDED RELEASE ORAL at 10:52

## 2018-06-05 RX ADMIN — MORPHINE SULFATE 2 MILLIGRAM(S): 50 CAPSULE, EXTENDED RELEASE ORAL at 18:20

## 2018-06-05 RX ADMIN — Medication 50 MICROGRAM(S): at 06:47

## 2018-06-05 RX ADMIN — Medication 300 MILLIGRAM(S): at 14:04

## 2018-06-05 RX ADMIN — NYSTATIN CREAM 1 APPLICATION(S): 100000 CREAM TOPICAL at 17:22

## 2018-06-05 RX ADMIN — MORPHINE SULFATE 2 MILLIGRAM(S): 50 CAPSULE, EXTENDED RELEASE ORAL at 11:05

## 2018-06-05 RX ADMIN — OXYCODONE HYDROCHLORIDE 10 MILLIGRAM(S): 5 TABLET ORAL at 06:47

## 2018-06-05 RX ADMIN — HEPARIN SODIUM 5000 UNIT(S): 5000 INJECTION INTRAVENOUS; SUBCUTANEOUS at 14:04

## 2018-06-05 RX ADMIN — MORPHINE SULFATE 2 MILLIGRAM(S): 50 CAPSULE, EXTENDED RELEASE ORAL at 18:35

## 2018-06-05 RX ADMIN — OXYCODONE HYDROCHLORIDE 10 MILLIGRAM(S): 5 TABLET ORAL at 17:22

## 2018-06-05 NOTE — PROGRESS NOTE ADULT - ASSESSMENT
37 year old female s/p R ankle reduction with delta frame  -pt seen and evaluated  -fu PT recommendations for dc, spoke with pt at length about working with PT today  -non weightbearing to right ankle   -keep dressing clean dry and intact, will follow up appearance tomorrow  -Plan ORIF once ankle wounds heal, likely as outpt but pt has remained in-house due to fear of pain so may result in soft tissue being stable for ORIF while still admitted  -discussed with Dr. Sellers

## 2018-06-05 NOTE — PROGRESS NOTE ADULT - SUBJECTIVE AND OBJECTIVE BOX
Pain is in a manageable range for her  NO acute SOB or CP    Vital Signs Last 24 Hrs  T(C): 36.7 (05 Jun 2018 13:22), Max: 36.9 (05 Jun 2018 06:41)  T(F): 98 (05 Jun 2018 13:22), Max: 98.4 (05 Jun 2018 06:41)  HR: 76 (05 Jun 2018 13:22) (76 - 93)  BP: 112/53 (05 Jun 2018 13:22) (107/73 - 122/76)  BP(mean): --  RR: 18 (05 Jun 2018 13:22) (17 - 18)  SpO2: 98% (05 Jun 2018 13:22) (98% - 100%)    General: more comfortable  HEENT: EOMI, NCAT  Neck: no JVD  CV: S1S2 RRR no MRG  Lungs: CTA BL  Abdomen: soft NTND +BS   Extremities: No CCE +WWP, (+) Right ankle wrapped in ace bandages, pin sites c/d/i  Skin: No rashes  Neuro: AAOx3, no focal deficits     LABS:                        12.2   7.01  )-----------( 271      ( 05 Jun 2018 07:00 )             36.8     06-05    136  |  98  |  12  ----------------------------<  93  4.7   |  23  |  0.84    Ca    9.4      05 Jun 2018 07:00        CAPILLARY BLOOD GLUCOSE

## 2018-06-05 NOTE — PROGRESS NOTE ADULT - SUBJECTIVE AND OBJECTIVE BOX
Patient is a 37y old  Female who presents with a chief complaint of Ankle pain (02 Jun 2018 16:58)       INTERVAL HPI/OVERNIGHT EVENTS:  Patient seen and evaluated at bedside.  Pt is resting comfortable in NAD. Denies N/V/F/C.     Allergies    No Known Allergies    Intolerances        Vital Signs Last 24 Hrs  T(C): 36.9 (05 Jun 2018 06:41), Max: 36.9 (05 Jun 2018 06:41)  T(F): 98.4 (05 Jun 2018 06:41), Max: 98.4 (05 Jun 2018 06:41)  HR: 92 (05 Jun 2018 06:41) (66 - 93)  BP: 122/76 (05 Jun 2018 06:41) (107/73 - 123/76)  BP(mean): --  RR: 17 (05 Jun 2018 06:41) (17 - 18)  SpO2: 100% (05 Jun 2018 06:41) (100% - 100%)    LABS:                        11.6   6.92  )-----------( 255      ( 04 Jun 2018 06:12 )             34.4     06-04    137  |  99  |  15  ----------------------------<  104<H>  4.3   |  25  |  0.80    Ca    9.4      04 Jun 2018 06:12          CAPILLARY BLOOD GLUCOSE          Lower Extremity Physical Exam:  dressing kept clean dry and intact, no strikethrough noted, CFT <3s x10    RADIOLOGY & ADDITIONAL TESTS:

## 2018-06-05 NOTE — PROGRESS NOTE ADULT - PROBLEM SELECTOR PLAN 1
c/w pain control with percocet/ MSO4 prn  Incentive spirometry  PT/OT; nonweight bearing rt  appreciate podiatry intervention  wounds will be examined tomorrow and decision on further surgery henceforth to be made

## 2018-06-05 NOTE — PROGRESS NOTE ADULT - ASSESSMENT
37-yo Female with hx of Hypothyroidism being admitted for acutely fractured R ankle s/p reduction and delta frame.

## 2018-06-06 RX ADMIN — NYSTATIN CREAM 1 APPLICATION(S): 100000 CREAM TOPICAL at 13:22

## 2018-06-06 RX ADMIN — MORPHINE SULFATE 4 MILLIGRAM(S): 50 CAPSULE, EXTENDED RELEASE ORAL at 08:48

## 2018-06-06 RX ADMIN — Medication 300 MILLIGRAM(S): at 13:22

## 2018-06-06 RX ADMIN — HEPARIN SODIUM 5000 UNIT(S): 5000 INJECTION INTRAVENOUS; SUBCUTANEOUS at 13:22

## 2018-06-06 RX ADMIN — Medication 1 TABLET(S): at 09:20

## 2018-06-06 RX ADMIN — POLYETHYLENE GLYCOL 3350 17 GRAM(S): 17 POWDER, FOR SOLUTION ORAL at 13:21

## 2018-06-06 RX ADMIN — Medication 50 MICROGRAM(S): at 06:04

## 2018-06-06 RX ADMIN — HEPARIN SODIUM 5000 UNIT(S): 5000 INJECTION INTRAVENOUS; SUBCUTANEOUS at 06:04

## 2018-06-06 RX ADMIN — Medication 300 MILLIGRAM(S): at 06:04

## 2018-06-06 RX ADMIN — OXYCODONE HYDROCHLORIDE 10 MILLIGRAM(S): 5 TABLET ORAL at 17:30

## 2018-06-06 RX ADMIN — MORPHINE SULFATE 2 MILLIGRAM(S): 50 CAPSULE, EXTENDED RELEASE ORAL at 21:04

## 2018-06-06 RX ADMIN — MORPHINE SULFATE 4 MILLIGRAM(S): 50 CAPSULE, EXTENDED RELEASE ORAL at 09:21

## 2018-06-06 RX ADMIN — OXYCODONE HYDROCHLORIDE 10 MILLIGRAM(S): 5 TABLET ORAL at 06:03

## 2018-06-06 RX ADMIN — Medication 300 MILLIGRAM(S): at 21:34

## 2018-06-06 RX ADMIN — Medication 3 MILLIGRAM(S): at 21:33

## 2018-06-06 RX ADMIN — HEPARIN SODIUM 5000 UNIT(S): 5000 INJECTION INTRAVENOUS; SUBCUTANEOUS at 21:33

## 2018-06-06 RX ADMIN — Medication 1 TABLET(S): at 17:29

## 2018-06-06 RX ADMIN — MORPHINE SULFATE 2 MILLIGRAM(S): 50 CAPSULE, EXTENDED RELEASE ORAL at 20:49

## 2018-06-06 NOTE — PROGRESS NOTE ADULT - SUBJECTIVE AND OBJECTIVE BOX
Patient is a 37y old  Female who presents with a chief complaint of Ankle pain (02 Jun 2018 16:58)       INTERVAL HPI/OVERNIGHT EVENTS:  Patient seen and evaluated at bedside.  Pt is resting comfortable in NAD. Denies N/V/F/C.  Pain rated at X/10    Allergies    No Known Allergies    Intolerances        Vital Signs Last 24 Hrs  T(C): 37 (06 Jun 2018 05:58), Max: 37 (06 Jun 2018 05:58)  T(F): 98.6 (06 Jun 2018 05:58), Max: 98.6 (06 Jun 2018 05:58)  HR: 77 (06 Jun 2018 05:58) (70 - 87)  BP: 123/65 (06 Jun 2018 05:58) (106/60 - 127/61)  BP(mean): --  RR: 17 (06 Jun 2018 05:58) (17 - 18)  SpO2: 100% (06 Jun 2018 05:58) (97% - 100%)    LABS:                        12.2   7.01  )-----------( 271      ( 05 Jun 2018 07:00 )             36.8     06-05    136  |  98  |  12  ----------------------------<  93  4.7   |  23  |  0.84    Ca    9.4      05 Jun 2018 07:00          CAPILLARY BLOOD GLUCOSE          Lower Extremity Physical Exam:  Vascular: DP/PT 2/4 B/L, CFT <2sec x 10, Temp gradient wnl B/L  Neurology: Epicritic sensation intact, B/L  R LE blisters resolved, no signs of necrosis, no erythema no drainage in along pin tracts, no swelling, frame intact, no pistoning    RADIOLOGY & ADDITIONAL TESTS:

## 2018-06-06 NOTE — PROGRESS NOTE ADULT - ASSESSMENT
37F R ankle fracture with ex-fix  - Pt seen and examined  - RLE swelling and blisters appears to be resolving  - Skin and soft tissue envelop appear to be in good integrity  - RTOR planning for R ankle removal of ex-fix and ORIF for Friday 6/8 4PM  - Please optimize pt for OR, general anesthesia  - Consented  - D/w attending

## 2018-06-06 NOTE — PROGRESS NOTE ADULT - PROBLEM SELECTOR PLAN 1
c/w pain control with percocet and IV morphine PRN  Incentive spirometry  PT/OT; nonweight bearing rt  appreciate podiatry intervention  Podiatry follow up.  Plan for conservative management vs. surgery  TTE with known rheumatic mitral valve disease, mild to mod MR.

## 2018-06-06 NOTE — PROGRESS NOTE ADULT - SUBJECTIVE AND OBJECTIVE BOX
Patient is a 37y old  Female who presents with a chief complaint of Ankle pain (02 Jun 2018 16:58)      SUBJECTIVE / OVERNIGHT EVENTS:  Pt seen and examined at bedside.   No overnight event. Pain with dressing change this am. Received IV pain med.  Feeling okay.   no cp, no sob, no n/v/d.       Vital Signs Last 24 Hrs  T(C): 37 (06 Jun 2018 05:58), Max: 37 (06 Jun 2018 05:58)  T(F): 98.6 (06 Jun 2018 05:58), Max: 98.6 (06 Jun 2018 05:58)  HR: 77 (06 Jun 2018 05:58) (70 - 87)  BP: 123/65 (06 Jun 2018 05:58) (106/60 - 127/61)  BP(mean): --  RR: 17 (06 Jun 2018 05:58) (17 - 18)  SpO2: 100% (06 Jun 2018 05:58) (97% - 100%)  I&O's Summary      PHYSICAL EXAM:  GENERAL: NAD, Comfortable  HEAD:  Atraumatic, Normocephalic  EYES: EOMI, PERRLA, conjunctiva and sclera clear  NECK: Supple, No JVD  CHEST/LUNG: Clear to auscultation bilaterally; No wheeze  HEART: Regular rate and rhythm; No murmurs, rubs, or gallops  ABDOMEN: Soft, Nontender, Nondistended; Bowel sounds present  Neuro: AAO x 3, no focal deficit, 5/5 b/l extremities  EXTREMITIES:  2+ Peripheral Pulses, No clubbing, cyanosis. (+) Right ankle wrapped in ace bandages, pin sites c/d/i  SKIN: No rashes or lesions    LABS:                        12.2   7.01  )-----------( 271      ( 05 Jun 2018 07:00 )             36.8     06-05    136  |  98  |  12  ----------------------------<  93  4.7   |  23  |  0.84    Ca    9.4      05 Jun 2018 07:00        CAPILLARY BLOOD GLUCOSE                RADIOLOGY & ADDITIONAL TESTS:    Imaging Personally Reviewed:  [x] YES  [ ] NO    Consultant(s) Notes Reviewed:  [x] YES  [ ] NO      MEDICATIONS  (STANDING):  clindamycin   Capsule 300 milliGRAM(s) Oral every 8 hours  heparin  Injectable 5000 Unit(s) SubCutaneous every 8 hours  lactobacillus acidophilus 1 Tablet(s) Oral two times a day with meals  levothyroxine 50 MICROGram(s) Oral daily  melatonin 3 milliGRAM(s) Oral at bedtime  melatonin      nystatin Powder 1 Application(s) Topical every 12 hours  oxyCODONE  ER Tablet 10 milliGRAM(s) Oral every 12 hours  polyethylene glycol 3350 17 Gram(s) Oral daily    MEDICATIONS  (PRN):  morphine  - Injectable 2 milliGRAM(s) IV Push every 4 hours PRN Moderate Pain (4 - 6)  morphine  - Injectable 4 milliGRAM(s) IV Push every 4 hours PRN Severe Pain (7 - 10)  ondansetron Injectable 4 milliGRAM(s) IV Push every 6 hours PRN Nausea and/or Vomiting      Care Discussed with Consultants/Other Providers [x] YES  [ ] NO    HEALTH ISSUES - PROBLEM Dx:  Prophylactic measure: Prophylactic measure  Hypothyroid: Hypothyroid  Ankle fracture, right, sequela: Ankle fracture, right, sequela

## 2018-06-07 ENCOUNTER — TRANSCRIPTION ENCOUNTER (OUTPATIENT)
Age: 38
End: 2018-06-07

## 2018-06-07 LAB
APTT BLD: 32.6 SEC — SIGNIFICANT CHANGE UP (ref 27.5–37.4)
BLD GP AB SCN SERPL QL: NEGATIVE — SIGNIFICANT CHANGE UP
BUN SERPL-MCNC: 11 MG/DL — SIGNIFICANT CHANGE UP (ref 7–23)
CALCIUM SERPL-MCNC: 9.2 MG/DL — SIGNIFICANT CHANGE UP (ref 8.4–10.5)
CHLORIDE SERPL-SCNC: 100 MMOL/L — SIGNIFICANT CHANGE UP (ref 98–107)
CO2 SERPL-SCNC: 22 MMOL/L — SIGNIFICANT CHANGE UP (ref 22–31)
CREAT SERPL-MCNC: 0.63 MG/DL — SIGNIFICANT CHANGE UP (ref 0.5–1.3)
GLUCOSE SERPL-MCNC: 102 MG/DL — HIGH (ref 70–99)
HCT VFR BLD CALC: 33.7 % — LOW (ref 34.5–45)
HGB BLD-MCNC: 11.5 G/DL — SIGNIFICANT CHANGE UP (ref 11.5–15.5)
INR BLD: 0.98 — SIGNIFICANT CHANGE UP (ref 0.88–1.17)
MCHC RBC-ENTMCNC: 29.5 PG — SIGNIFICANT CHANGE UP (ref 27–34)
MCHC RBC-ENTMCNC: 34.1 % — SIGNIFICANT CHANGE UP (ref 32–36)
MCV RBC AUTO: 86.4 FL — SIGNIFICANT CHANGE UP (ref 80–100)
NRBC # FLD: 0 — SIGNIFICANT CHANGE UP
PLATELET # BLD AUTO: 248 K/UL — SIGNIFICANT CHANGE UP (ref 150–400)
PMV BLD: 11.8 FL — SIGNIFICANT CHANGE UP (ref 7–13)
POTASSIUM SERPL-MCNC: 4 MMOL/L — SIGNIFICANT CHANGE UP (ref 3.5–5.3)
POTASSIUM SERPL-SCNC: 4 MMOL/L — SIGNIFICANT CHANGE UP (ref 3.5–5.3)
PROTHROM AB SERPL-ACNC: 10.9 SEC — SIGNIFICANT CHANGE UP (ref 9.8–13.1)
RBC # BLD: 3.9 M/UL — SIGNIFICANT CHANGE UP (ref 3.8–5.2)
RBC # FLD: 12.7 % — SIGNIFICANT CHANGE UP (ref 10.3–14.5)
RH IG SCN BLD-IMP: POSITIVE — SIGNIFICANT CHANGE UP
SODIUM SERPL-SCNC: 138 MMOL/L — SIGNIFICANT CHANGE UP (ref 135–145)
WBC # BLD: 6.23 K/UL — SIGNIFICANT CHANGE UP (ref 3.8–10.5)
WBC # FLD AUTO: 6.23 K/UL — SIGNIFICANT CHANGE UP (ref 3.8–10.5)

## 2018-06-07 RX ORDER — MORPHINE SULFATE 50 MG/1
2 CAPSULE, EXTENDED RELEASE ORAL EVERY 4 HOURS
Qty: 0 | Refills: 0 | Status: DISCONTINUED | OUTPATIENT
Start: 2018-06-07 | End: 2018-06-10

## 2018-06-07 RX ORDER — MORPHINE SULFATE 50 MG/1
4 CAPSULE, EXTENDED RELEASE ORAL EVERY 4 HOURS
Qty: 0 | Refills: 0 | Status: DISCONTINUED | OUTPATIENT
Start: 2018-06-07 | End: 2018-06-10

## 2018-06-07 RX ORDER — OXYCODONE HYDROCHLORIDE 5 MG/1
10 TABLET ORAL EVERY 12 HOURS
Qty: 0 | Refills: 0 | Status: DISCONTINUED | OUTPATIENT
Start: 2018-06-07 | End: 2018-06-11

## 2018-06-07 RX ADMIN — Medication 300 MILLIGRAM(S): at 21:59

## 2018-06-07 RX ADMIN — MORPHINE SULFATE 2 MILLIGRAM(S): 50 CAPSULE, EXTENDED RELEASE ORAL at 11:55

## 2018-06-07 RX ADMIN — NYSTATIN CREAM 1 APPLICATION(S): 100000 CREAM TOPICAL at 18:23

## 2018-06-07 RX ADMIN — Medication 1 TABLET(S): at 10:10

## 2018-06-07 RX ADMIN — POLYETHYLENE GLYCOL 3350 17 GRAM(S): 17 POWDER, FOR SOLUTION ORAL at 11:46

## 2018-06-07 RX ADMIN — MORPHINE SULFATE 2 MILLIGRAM(S): 50 CAPSULE, EXTENDED RELEASE ORAL at 11:39

## 2018-06-07 RX ADMIN — HEPARIN SODIUM 5000 UNIT(S): 5000 INJECTION INTRAVENOUS; SUBCUTANEOUS at 06:31

## 2018-06-07 RX ADMIN — Medication 300 MILLIGRAM(S): at 06:30

## 2018-06-07 RX ADMIN — Medication 1 TABLET(S): at 18:22

## 2018-06-07 RX ADMIN — Medication 300 MILLIGRAM(S): at 13:54

## 2018-06-07 RX ADMIN — Medication 50 MICROGRAM(S): at 06:31

## 2018-06-07 RX ADMIN — OXYCODONE HYDROCHLORIDE 10 MILLIGRAM(S): 5 TABLET ORAL at 18:21

## 2018-06-07 RX ADMIN — OXYCODONE HYDROCHLORIDE 10 MILLIGRAM(S): 5 TABLET ORAL at 19:21

## 2018-06-07 RX ADMIN — OXYCODONE HYDROCHLORIDE 10 MILLIGRAM(S): 5 TABLET ORAL at 06:30

## 2018-06-07 RX ADMIN — HEPARIN SODIUM 5000 UNIT(S): 5000 INJECTION INTRAVENOUS; SUBCUTANEOUS at 21:59

## 2018-06-07 RX ADMIN — NYSTATIN CREAM 1 APPLICATION(S): 100000 CREAM TOPICAL at 06:31

## 2018-06-07 RX ADMIN — HEPARIN SODIUM 5000 UNIT(S): 5000 INJECTION INTRAVENOUS; SUBCUTANEOUS at 13:54

## 2018-06-07 NOTE — PROGRESS NOTE ADULT - SUBJECTIVE AND OBJECTIVE BOX
Patient is a 37y old  Female who presents with a chief complaint of Ankle pain (02 Jun 2018 16:58)       INTERVAL HPI/OVERNIGHT EVENTS:  Patient seen and evaluated at bedside.  Pt is resting comfortable in NAD. Denies N/V/F/C.     Allergies    No Known Allergies    Intolerances        Vital Signs Last 24 Hrs  T(C): 36.6 (07 Jun 2018 05:43), Max: 36.8 (06 Jun 2018 13:30)  T(F): 97.8 (07 Jun 2018 05:43), Max: 98.3 (06 Jun 2018 13:30)  HR: 68 (07 Jun 2018 05:43) (67 - 73)  BP: 126/76 (07 Jun 2018 05:43) (111/64 - 136/79)  BP(mean): --  RR: 16 (07 Jun 2018 05:43) (16 - 18)  SpO2: 100% (07 Jun 2018 05:43) (96% - 100%)    LABS:                        11.5   6.23  )-----------( 248      ( 07 Jun 2018 06:30 )             33.7     06-07    138  |  100  |  11  ----------------------------<  102<H>  4.0   |  22  |  0.63    Ca    9.2      07 Jun 2018 06:30      PT/INR - ( 07 Jun 2018 06:30 )   PT: 10.9 SEC;   INR: 0.98          PTT - ( 07 Jun 2018 06:30 )  PTT:32.6 SEC    CAPILLARY BLOOD GLUCOSE          Lower Extremity Physical Exam:  dressing kept clean dry and intact

## 2018-06-07 NOTE — DIETITIAN INITIAL EVALUATION ADULT. - OTHER INFO
Pt seen for Length of stay. Pt 38 yo female 37F R ankle fracture with ex-fix; plan for R ankle removal of ex-fix and ORIF for Friday 6/8. Pt appears alert, oriented. Per Pt her appetite usually "okay"; No chew/swallow problem voiced; no nausea/vomiting/diarrhea reported @ present. At home Pt eats Regular food reported. Per Pt her UBW: ~183#, stable. No food related concerns voiced. RDN remains available, Pt made aware.

## 2018-06-07 NOTE — PROGRESS NOTE ADULT - PROBLEM SELECTOR PLAN 1
c/w pain control with percocet and IV morphine PRN  Incentive spirometry  PT/OT; nonweight bearing rt  appreciate podiatry intervention  Podiatry follow up.  Plan for surgery tomorrow  TTE with known rheumatic mitral valve disease, mild to mod MR.    *** Risk Stratification for planned procedure: ***  Vitals/Labs/Chart reviewed. EKG reviewed. Pt feels well. No Chest pain, No shortness of breath.  Pt is able to perform > 4 METs of activity prior to ankle injury.   No signs of acute ischemia nor acute cardio/pulmonary decompensation.    Patient is at intermediate risk for intermediate risk procedure.  No further medical workup needed.   Antibiotic pre and post procedure per protocol to prevent endocarditis given present of rheumatic heart valve.

## 2018-06-07 NOTE — PROGRESS NOTE ADULT - ASSESSMENT
37F R ankle fracture with ex-fix  - Pt seen and examined  - RTOR planning for R ankle removal of ex-fix and ORIF for Friday 6/8  - Please optimize pt for OR, general anesthesia  - Consented  - D/w attending 37F R ankle fracture with ex-fix  - Pt seen and examined  - RTOR planning for R ankle removal of ex-fix and ORIF for Friday 6/8 @ 1420  - Please optimize pt for OR, general anesthesia  - Consented  - Pre-op orders in   - D/w attending

## 2018-06-07 NOTE — PROVIDER CONTACT NOTE (OTHER) - ASSESSMENT
Pt denies feeling any numbness of tingling.  Positive warmth, positive toe movement and sensation of touch.  Pt states "I feel like theres 20 lbs added on my foot, I didn't feel like this before."

## 2018-06-07 NOTE — DIETITIAN INITIAL EVALUATION ADULT. - NS AS NUTRI INTERV MEALS SNACK
1. Encourage & assist Pt with meals as needed; Monitor PO diet tolerance;             2. Monitor labs, weights, hydration status;/Other (specify)

## 2018-06-07 NOTE — PROGRESS NOTE ADULT - SUBJECTIVE AND OBJECTIVE BOX
Patient is a 37y old  Female who presents with a chief complaint of Ankle pain (02 Jun 2018 16:58)      SUBJECTIVE / OVERNIGHT EVENTS:  feels well.  +pain but on pain meds.  The  at bedside.  no cp, no sob, no n/v/d. no abdominal pain.  no headache, no dizziness.       Vital Signs Last 24 Hrs  T(C): 36.6 (07 Jun 2018 22:29), Max: 36.6 (07 Jun 2018 05:43)  T(F): 97.9 (07 Jun 2018 22:29), Max: 97.9 (07 Jun 2018 22:29)  HR: 70 (07 Jun 2018 22:29) (68 - 73)  BP: 140/79 (07 Jun 2018 22:29) (117/75 - 140/79)  BP(mean): --  RR: 18 (07 Jun 2018 22:29) (16 - 18)  SpO2: 100% (07 Jun 2018 22:29) (100% - 100%)  I&O's Summary      PHYSICAL EXAM:  GENERAL: NAD, Comfortable  HEAD:  Atraumatic, Normocephalic  EYES: EOMI, PERRLA, conjunctiva and sclera clear  NECK: Supple, No JVD  CHEST/LUNG: Clear to auscultation bilaterally; No wheeze  HEART: Regular rate and rhythm; No murmurs, rubs, or gallops  ABDOMEN: Soft, Nontender, Nondistended; Bowel sounds present  Neuro: AAO x 3, no focal deficit, 5/5 b/l extremities  EXTREMITIES:  2+ Peripheral Pulses, No clubbing, cyanosis. (+) Right ankle wrapped in ace bandages, pin sites c/d/i  SKIN: No rashes or lesions      LABS:                        11.5   6.23  )-----------( 248      ( 07 Jun 2018 06:30 )             33.7     06-07    138  |  100  |  11  ----------------------------<  102<H>  4.0   |  22  |  0.63    Ca    9.2      07 Jun 2018 06:30      PT/INR - ( 07 Jun 2018 06:30 )   PT: 10.9 SEC;   INR: 0.98          PTT - ( 07 Jun 2018 06:30 )  PTT:32.6 SEC  CAPILLARY BLOOD GLUCOSE                RADIOLOGY & ADDITIONAL TESTS:    Imaging Personally Reviewed:  [x] YES  [ ] NO    Consultant(s) Notes Reviewed:  [x] YES  [ ] NO      MEDICATIONS  (STANDING):  clindamycin   Capsule 300 milliGRAM(s) Oral every 8 hours  heparin  Injectable 5000 Unit(s) SubCutaneous every 8 hours  lactobacillus acidophilus 1 Tablet(s) Oral two times a day with meals  levothyroxine 50 MICROGram(s) Oral daily  melatonin 3 milliGRAM(s) Oral at bedtime  melatonin      nystatin Powder 1 Application(s) Topical every 12 hours  oxyCODONE  ER Tablet 10 milliGRAM(s) Oral every 12 hours  polyethylene glycol 3350 17 Gram(s) Oral daily    MEDICATIONS  (PRN):  morphine  - Injectable 4 milliGRAM(s) IV Push every 4 hours PRN Severe Pain (7 - 10)  morphine  - Injectable 2 milliGRAM(s) IV Push every 4 hours PRN Moderate Pain (4 - 6)  ondansetron Injectable 4 milliGRAM(s) IV Push every 6 hours PRN Nausea and/or Vomiting      Care Discussed with Consultants/Other Providers [x] YES  [ ] NO    HEALTH ISSUES - PROBLEM Dx:  Prophylactic measure: Prophylactic measure  Hypothyroid: Hypothyroid  Ankle fracture, right, sequela: Ankle fracture, right, sequela

## 2018-06-08 LAB
APTT BLD: 32.3 SEC — SIGNIFICANT CHANGE UP (ref 27.5–37.4)
BUN SERPL-MCNC: 14 MG/DL — SIGNIFICANT CHANGE UP (ref 7–23)
CALCIUM SERPL-MCNC: 9.2 MG/DL — SIGNIFICANT CHANGE UP (ref 8.4–10.5)
CHLORIDE SERPL-SCNC: 99 MMOL/L — SIGNIFICANT CHANGE UP (ref 98–107)
CO2 SERPL-SCNC: 22 MMOL/L — SIGNIFICANT CHANGE UP (ref 22–31)
CREAT SERPL-MCNC: 0.84 MG/DL — SIGNIFICANT CHANGE UP (ref 0.5–1.3)
GLUCOSE SERPL-MCNC: 106 MG/DL — HIGH (ref 70–99)
HCG SERPL-ACNC: < 5 MIU/ML — SIGNIFICANT CHANGE UP
HCT VFR BLD CALC: 34.3 % — LOW (ref 34.5–45)
HGB BLD-MCNC: 11.4 G/DL — LOW (ref 11.5–15.5)
INR BLD: 0.97 — SIGNIFICANT CHANGE UP (ref 0.88–1.17)
MCHC RBC-ENTMCNC: 29.7 PG — SIGNIFICANT CHANGE UP (ref 27–34)
MCHC RBC-ENTMCNC: 33.2 % — SIGNIFICANT CHANGE UP (ref 32–36)
MCV RBC AUTO: 89.3 FL — SIGNIFICANT CHANGE UP (ref 80–100)
NRBC # FLD: 0 — SIGNIFICANT CHANGE UP
PLATELET # BLD AUTO: 239 K/UL — SIGNIFICANT CHANGE UP (ref 150–400)
PMV BLD: 12.1 FL — SIGNIFICANT CHANGE UP (ref 7–13)
POTASSIUM SERPL-MCNC: 4.1 MMOL/L — SIGNIFICANT CHANGE UP (ref 3.5–5.3)
POTASSIUM SERPL-SCNC: 4.1 MMOL/L — SIGNIFICANT CHANGE UP (ref 3.5–5.3)
PROTHROM AB SERPL-ACNC: 11.2 SEC — SIGNIFICANT CHANGE UP (ref 9.8–13.1)
RBC # BLD: 3.84 M/UL — SIGNIFICANT CHANGE UP (ref 3.8–5.2)
RBC # FLD: 12.6 % — SIGNIFICANT CHANGE UP (ref 10.3–14.5)
SODIUM SERPL-SCNC: 136 MMOL/L — SIGNIFICANT CHANGE UP (ref 135–145)
WBC # BLD: 6.71 K/UL — SIGNIFICANT CHANGE UP (ref 3.8–10.5)
WBC # FLD AUTO: 6.71 K/UL — SIGNIFICANT CHANGE UP (ref 3.8–10.5)

## 2018-06-08 PROCEDURE — 73610 X-RAY EXAM OF ANKLE: CPT | Mod: 26,RT

## 2018-06-08 RX ORDER — ACETAMINOPHEN 500 MG
650 TABLET ORAL EVERY 6 HOURS
Qty: 0 | Refills: 0 | Status: DISCONTINUED | OUTPATIENT
Start: 2018-06-08 | End: 2018-06-11

## 2018-06-08 RX ORDER — SODIUM CHLORIDE 9 MG/ML
1000 INJECTION, SOLUTION INTRAVENOUS
Qty: 0 | Refills: 0 | Status: DISCONTINUED | OUTPATIENT
Start: 2018-06-08 | End: 2018-06-10

## 2018-06-08 RX ADMIN — Medication 3 MILLIGRAM(S): at 22:45

## 2018-06-08 RX ADMIN — HEPARIN SODIUM 5000 UNIT(S): 5000 INJECTION INTRAVENOUS; SUBCUTANEOUS at 22:45

## 2018-06-08 RX ADMIN — OXYCODONE HYDROCHLORIDE 10 MILLIGRAM(S): 5 TABLET ORAL at 05:58

## 2018-06-08 RX ADMIN — Medication 50 MICROGRAM(S): at 05:58

## 2018-06-08 RX ADMIN — SODIUM CHLORIDE 75 MILLILITER(S): 9 INJECTION, SOLUTION INTRAVENOUS at 22:58

## 2018-06-08 RX ADMIN — Medication 300 MILLIGRAM(S): at 22:45

## 2018-06-08 RX ADMIN — Medication 300 MILLIGRAM(S): at 05:58

## 2018-06-08 RX ADMIN — Medication 300 MILLIGRAM(S): at 13:26

## 2018-06-08 RX ADMIN — NYSTATIN CREAM 1 APPLICATION(S): 100000 CREAM TOPICAL at 05:58

## 2018-06-08 RX ADMIN — MORPHINE SULFATE 2 MILLIGRAM(S): 50 CAPSULE, EXTENDED RELEASE ORAL at 02:24

## 2018-06-08 RX ADMIN — SODIUM CHLORIDE 75 MILLILITER(S): 9 INJECTION, SOLUTION INTRAVENOUS at 09:32

## 2018-06-08 RX ADMIN — MORPHINE SULFATE 2 MILLIGRAM(S): 50 CAPSULE, EXTENDED RELEASE ORAL at 09:45

## 2018-06-08 RX ADMIN — MORPHINE SULFATE 2 MILLIGRAM(S): 50 CAPSULE, EXTENDED RELEASE ORAL at 09:31

## 2018-06-08 NOTE — CHART NOTE - NSCHARTNOTEFT_GEN_A_CORE
Call received from podiatry regarding post op instruction. Per podiatry no weight bearing right ankle for 1 week and no need for antibiotics. Clindamycin discontinued. Continue to monitor. Call received from podiatry regarding post op instruction. Per podiatry no weight bearing right ankle for 1 week and no need for antibiotics. Clindamycin discontinued . Attending note reviewed , per attending note on 6/7/18  Antibiotic pre and post procedure per protocol to prevent endocarditis given present of rheumatic heart valve.  Clindamycin 300mg po Q8 hours reordered , to be discussed with attending in Am about discontinuation of antibiotics .  Continue to monitor.

## 2018-06-08 NOTE — PROGRESS NOTE ADULT - ASSESSMENT
Pt is scheduled for R ankle ORIF with Dr. Sellers at 2:30PM.  CXR on sunrise.  EKG on sunrise.  Medical/Cardiac clearance since 6/7 and documented in chart.  Consent signed and in chart.  Procedure was explained to patient in detail. All alternatives, risks and complications were discussed. All questions answered.

## 2018-06-08 NOTE — PROGRESS NOTE ADULT - PROBLEM SELECTOR PLAN 1
c/w pain control with percocet and IV morphine PRN  Incentive spirometry  PT/OT; nonweight bearing rt  appreciate podiatry intervention  Podiatry follow up.  Plan for surgery today. IVF while NPO.   TTE with known rheumatic mitral valve disease, mild to mod MR.  Med clearance in previous note.  abx per Podiatry.

## 2018-06-08 NOTE — BRIEF OPERATIVE NOTE - POST-OP DX
Ankle fracture, right  05/31/2018  right ankle trimalleolar fracture  Active  Dawit Castelan
Ankle fracture, right  05/31/2018  right ankle trimalleolar fracture  Active  Dawit Castelan

## 2018-06-08 NOTE — PROGRESS NOTE ADULT - SUBJECTIVE AND OBJECTIVE BOX
Patient is a 37y old  Female who presents with a chief complaint of Ankle pain (02 Jun 2018 16:58)      SUBJECTIVE / OVERNIGHT EVENTS:  No events.  Feel well. NPO for procedure.   no complaints.   no cp, no sob, no n/v/d.  no abd pain.       Vital Signs Last 24 Hrs  T(C): 36.7 (08 Jun 2018 09:30), Max: 36.7 (08 Jun 2018 09:30)  T(F): 98.1 (08 Jun 2018 09:30), Max: 98.1 (08 Jun 2018 09:30)  HR: 66 (08 Jun 2018 09:30) (60 - 73)  BP: 122/69 (08 Jun 2018 09:30) (117/75 - 140/79)  BP(mean): --  RR: 17 (08 Jun 2018 09:30) (17 - 18)  SpO2: 100% (08 Jun 2018 09:30) (100% - 100%)  I&O's Summary      PHYSICAL EXAM:  GENERAL: NAD, Comfortable  HEAD:  Atraumatic, Normocephalic  EYES: EOMI, PERRLA, conjunctiva and sclera clear  NECK: Supple, No JVD  CHEST/LUNG: Clear to auscultation bilaterally; No wheeze  HEART: Regular rate and rhythm; No murmurs, rubs, or gallops  ABDOMEN: Soft, Nontender, Nondistended; Bowel sounds present  Neuro: AAO x 3, no focal deficit, 5/5 b/l extremities  EXTREMITIES:  2+ Peripheral Pulses, No clubbing, cyanosis. (+) Right ankle wrapped in ace bandages, pin sites c/d/i  SKIN: No rashes or lesions      LABS:                        11.4   6.71  )-----------( 239      ( 08 Jun 2018 04:38 )             34.3     06-08    136  |  99  |  14  ----------------------------<  106<H>  4.1   |  22  |  0.84    Ca    9.2      08 Jun 2018 04:38      PT/INR - ( 08 Jun 2018 04:38 )   PT: 11.2 SEC;   INR: 0.97          PTT - ( 08 Jun 2018 04:38 )  PTT:32.3 SEC  CAPILLARY BLOOD GLUCOSE                RADIOLOGY & ADDITIONAL TESTS:    Imaging Personally Reviewed:  [x] YES  [ ] NO    Consultant(s) Notes Reviewed:  [x] YES  [ ] NO      MEDICATIONS  (STANDING):  clindamycin   Capsule 300 milliGRAM(s) Oral every 8 hours  dextrose 5% + sodium chloride 0.45%. 1000 milliLiter(s) (75 mL/Hr) IV Continuous <Continuous>  heparin  Injectable 5000 Unit(s) SubCutaneous every 8 hours  lactobacillus acidophilus 1 Tablet(s) Oral two times a day with meals  levothyroxine 50 MICROGram(s) Oral daily  melatonin 3 milliGRAM(s) Oral at bedtime  melatonin      nystatin Powder 1 Application(s) Topical every 12 hours  oxyCODONE  ER Tablet 10 milliGRAM(s) Oral every 12 hours  polyethylene glycol 3350 17 Gram(s) Oral daily    MEDICATIONS  (PRN):  morphine  - Injectable 4 milliGRAM(s) IV Push every 4 hours PRN Severe Pain (7 - 10)  morphine  - Injectable 2 milliGRAM(s) IV Push every 4 hours PRN Moderate Pain (4 - 6)  ondansetron Injectable 4 milliGRAM(s) IV Push every 6 hours PRN Nausea and/or Vomiting      Care Discussed with Consultants/Other Providers [x] YES  [ ] NO    HEALTH ISSUES - PROBLEM Dx:  Prophylactic measure: Prophylactic measure  Hypothyroid: Hypothyroid  Ankle fracture, right, sequela: Ankle fracture, right, sequela

## 2018-06-08 NOTE — PROGRESS NOTE ADULT - SUBJECTIVE AND OBJECTIVE BOX
Patient is a 37y old  Female who presents with a chief complaint of Ankle pain (02 Jun 2018 16:58)      INTERVAL HPI/OVERNIGHT EVENTS:   Pt is scheduled for R ankle ORIF with Dr. Sellers at 2:30PM. Patient is aware of procedure and is NPO since midnight.    MEDICATIONS  (STANDING):  clindamycin   Capsule 300 milliGRAM(s) Oral every 8 hours  heparin  Injectable 5000 Unit(s) SubCutaneous every 8 hours  lactobacillus acidophilus 1 Tablet(s) Oral two times a day with meals  levothyroxine 50 MICROGram(s) Oral daily  melatonin 3 milliGRAM(s) Oral at bedtime  melatonin      nystatin Powder 1 Application(s) Topical every 12 hours  oxyCODONE  ER Tablet 10 milliGRAM(s) Oral every 12 hours  polyethylene glycol 3350 17 Gram(s) Oral daily    MEDICATIONS  (PRN):  morphine  - Injectable 4 milliGRAM(s) IV Push every 4 hours PRN Severe Pain (7 - 10)  morphine  - Injectable 2 milliGRAM(s) IV Push every 4 hours PRN Moderate Pain (4 - 6)  ondansetron Injectable 4 milliGRAM(s) IV Push every 6 hours PRN Nausea and/or Vomiting      Allergies    No Known Allergies    Intolerances        Vital Signs Last 24 Hrs  T(C): 36.4 (08 Jun 2018 05:51), Max: 36.6 (07 Jun 2018 22:29)  T(F): 97.6 (08 Jun 2018 05:51), Max: 97.9 (07 Jun 2018 22:29)  HR: 60 (08 Jun 2018 05:51) (60 - 73)  BP: 125/72 (08 Jun 2018 05:51) (117/75 - 140/79)  BP(mean): --  RR: 18 (08 Jun 2018 05:51) (17 - 18)  SpO2: 100% (08 Jun 2018 05:51) (100% - 100%)    LABS:                        11.4   6.71  )-----------( 239      ( 08 Jun 2018 04:38 )             34.3     06-08    136  |  99  |  14  ----------------------------<  106<H>  4.1   |  22  |  0.84    Ca    9.2      08 Jun 2018 04:38      PT/INR - ( 08 Jun 2018 04:38 )   PT: 11.2 SEC;   INR: 0.97          PTT - ( 08 Jun 2018 04:38 )  PTT:32.3 SEC    CAPILLARY BLOOD GLUCOSE          RADIOLOGY & ADDITIONAL TESTS:

## 2018-06-09 RX ORDER — CEFAZOLIN SODIUM 1 G
1000 VIAL (EA) INJECTION ONCE
Qty: 0 | Refills: 0 | Status: COMPLETED | OUTPATIENT
Start: 2018-06-09 | End: 2018-06-09

## 2018-06-09 RX ADMIN — HEPARIN SODIUM 5000 UNIT(S): 5000 INJECTION INTRAVENOUS; SUBCUTANEOUS at 06:45

## 2018-06-09 RX ADMIN — MORPHINE SULFATE 2 MILLIGRAM(S): 50 CAPSULE, EXTENDED RELEASE ORAL at 21:41

## 2018-06-09 RX ADMIN — Medication 300 MILLIGRAM(S): at 06:46

## 2018-06-09 RX ADMIN — MORPHINE SULFATE 2 MILLIGRAM(S): 50 CAPSULE, EXTENDED RELEASE ORAL at 06:53

## 2018-06-09 RX ADMIN — HEPARIN SODIUM 5000 UNIT(S): 5000 INJECTION INTRAVENOUS; SUBCUTANEOUS at 13:30

## 2018-06-09 RX ADMIN — MORPHINE SULFATE 2 MILLIGRAM(S): 50 CAPSULE, EXTENDED RELEASE ORAL at 12:55

## 2018-06-09 RX ADMIN — MORPHINE SULFATE 2 MILLIGRAM(S): 50 CAPSULE, EXTENDED RELEASE ORAL at 22:11

## 2018-06-09 RX ADMIN — POLYETHYLENE GLYCOL 3350 17 GRAM(S): 17 POWDER, FOR SOLUTION ORAL at 11:18

## 2018-06-09 RX ADMIN — MORPHINE SULFATE 2 MILLIGRAM(S): 50 CAPSULE, EXTENDED RELEASE ORAL at 00:00

## 2018-06-09 RX ADMIN — OXYCODONE HYDROCHLORIDE 10 MILLIGRAM(S): 5 TABLET ORAL at 07:15

## 2018-06-09 RX ADMIN — Medication 100 MILLIGRAM(S): at 10:40

## 2018-06-09 RX ADMIN — OXYCODONE HYDROCHLORIDE 10 MILLIGRAM(S): 5 TABLET ORAL at 06:45

## 2018-06-09 RX ADMIN — OXYCODONE HYDROCHLORIDE 10 MILLIGRAM(S): 5 TABLET ORAL at 17:01

## 2018-06-09 RX ADMIN — Medication 3 MILLIGRAM(S): at 21:43

## 2018-06-09 RX ADMIN — Medication 1 TABLET(S): at 17:01

## 2018-06-09 RX ADMIN — Medication 50 MICROGRAM(S): at 06:45

## 2018-06-09 RX ADMIN — MORPHINE SULFATE 2 MILLIGRAM(S): 50 CAPSULE, EXTENDED RELEASE ORAL at 03:10

## 2018-06-09 RX ADMIN — Medication 1 TABLET(S): at 08:56

## 2018-06-09 RX ADMIN — MORPHINE SULFATE 2 MILLIGRAM(S): 50 CAPSULE, EXTENDED RELEASE ORAL at 12:40

## 2018-06-09 RX ADMIN — NYSTATIN CREAM 1 APPLICATION(S): 100000 CREAM TOPICAL at 17:01

## 2018-06-09 RX ADMIN — MORPHINE SULFATE 2 MILLIGRAM(S): 50 CAPSULE, EXTENDED RELEASE ORAL at 02:40

## 2018-06-09 RX ADMIN — HEPARIN SODIUM 5000 UNIT(S): 5000 INJECTION INTRAVENOUS; SUBCUTANEOUS at 21:43

## 2018-06-09 RX ADMIN — NYSTATIN CREAM 1 APPLICATION(S): 100000 CREAM TOPICAL at 06:45

## 2018-06-09 NOTE — PROGRESS NOTE ADULT - SUBJECTIVE AND OBJECTIVE BOX
Patient is a 37y old  Female who presents with a chief complaint of Ankle pain (02 Jun 2018 16:58)      INTERVAL HPI/OVERNIGHT EVENTS:   Pt is s/p ORIF of Ankle Fracture.  Pt is POD #1.  Pt reports pain well controlled, no overnight events. Pt in NAD.    MEDICATIONS  (STANDING):  heparin  Injectable 5000 Unit(s) SubCutaneous every 8 hours  lactobacillus acidophilus 1 Tablet(s) Oral two times a day with meals  levothyroxine 50 MICROGram(s) Oral daily  melatonin 3 milliGRAM(s) Oral at bedtime  melatonin      nystatin Powder 1 Application(s) Topical every 12 hours  oxyCODONE  ER Tablet 10 milliGRAM(s) Oral every 12 hours  polyethylene glycol 3350 17 Gram(s) Oral daily    MEDICATIONS  (PRN):  acetaminophen   Tablet. 650 milliGRAM(s) Oral every 6 hours PRN Mild Pain (1 - 3)  morphine  - Injectable 4 milliGRAM(s) IV Push every 4 hours PRN Severe Pain (7 - 10)  morphine  - Injectable 2 milliGRAM(s) IV Push every 4 hours PRN Moderate Pain (4 - 6)  ondansetron Injectable 4 milliGRAM(s) IV Push every 6 hours PRN Nausea and/or Vomiting      Allergies    No Known Allergies    Intolerances  Vital Signs Last 24 Hrs  T(C): 36.3 (10 Nikita 2018 05:50), Max: 36.8 (09 Jun 2018 11:18)  T(F): 97.4 (10 Nikita 2018 05:50), Max: 98.3 (09 Jun 2018 11:18)  HR: 62 (10 Nikita 2018 05:50) (62 - 85)  BP: 121/74 (10 Nikita 2018 05:50) (113/54 - 138/79)  BP(mean): --  RR: 17 (10 Nikita 2018 05:50) (17 - 17)  SpO2: 98% (10 Nikita 2018 05:50) (97% - 99%)    LABS:                        10.4   9.51  )-----------( 234      ( 10 Nikita 2018 05:39 )             30.5     06-10    137  |  100  |  10  ----------------------------<  104<H>  4.1   |  23  |  0.69    Ca    8.7      10 Nikita 2018 05:39  Phos  3.5     06-10  Mg     1.9     06-10    TPro  6.7  /  Alb  3.7  /  TBili  0.2  /  DBili  x   /  AST  17  /  ALT  9   /  AlkPhos  111  06-10        CAPILLARY BLOOD GLUCOSE          RADIOLOGY & ADDITIONAL TESTS:    Imaging Personally Reviewed:  [x] YES  [ ] NO    Consultant(s) Notes Reviewed:  [x] YES  [ ] NO    Prophylactic measure  Hypothyroid  Ankle fracture, right, sequela    Lower Extremity Physical Exam:  CFT to digits instant. Dressing CDI without signs of wear plantarly.

## 2018-06-09 NOTE — PROGRESS NOTE ADULT - ASSESSMENT
37-yo Female with PMhx of Hypothyroidism, rheumatic heart valve, being admitted for acutely fractured R ankle s/p reduction and delta frame.

## 2018-06-09 NOTE — PROGRESS NOTE ADULT - PROBLEM SELECTOR PLAN 1
c/w pain control with percocet and IV morphine PRN  Incentive spirometry  PT/OT eval, nonweight bearing right ankle for 1 week.   appreciate podiatry intervention  s/p delta frame removal with ORIF of right ankle 6/8  Plan for surgery today. IVF while NPO.   TTE with known rheumatic mitral valve disease, mild to mod MR.  Med clearance in previous note. Ancef x 1 given.  Pt has been on Clindamycin for 10 days. will discontinue per podiatry.

## 2018-06-09 NOTE — PROGRESS NOTE ADULT - SUBJECTIVE AND OBJECTIVE BOX
Patient is a 37y old  Female who presents with a chief complaint of Ankle pain (02 Jun 2018 16:58)      SUBJECTIVE / OVERNIGHT EVENTS:  No overnight events.  No cp/sob/n/v/d.  No HA/dizziness.  No abdominal pain.       Vital Signs Last 24 Hrs  T(C): 36.8 (09 Jun 2018 17:00), Max: 37.7 (08 Jun 2018 20:00)  T(F): 98.2 (09 Jun 2018 17:00), Max: 99.9 (08 Jun 2018 20:00)  HR: 72 (09 Jun 2018 17:00) (72 - 120)  BP: 127/60 (09 Jun 2018 17:00) (107/63 - 142/77)  BP(mean): 89 (08 Jun 2018 20:45) (87 - 93)  RR: 17 (09 Jun 2018 17:00) (13 - 18)  SpO2: 99% (09 Jun 2018 17:00) (96% - 100%)  I&O's Summary      PHYSICAL EXAM:  GENERAL: NAD, Comfortable  HEAD:  Atraumatic, Normocephalic  EYES: EOMI, PERRLA, conjunctiva and sclera clear  NECK: Supple, No JVD  CHEST/LUNG: Clear to auscultation bilaterally; No wheeze  HEART: Regular rate and rhythm; No murmurs, rubs, or gallops  ABDOMEN: Soft, Nontender, Nondistended; Bowel sounds present  Neuro: AAO x 3, no focal deficit, 5/5 b/l extremities  EXTREMITIES:  2+ Peripheral Pulses, No clubbing, cyanosis. (+) Right ankle dressing c/d/i  SKIN: No rashes or lesions    LABS:                        11.4   10.55 )-----------( 265      ( 09 Jun 2018 07:24 )             32.8     06-09    135  |  100  |  12  ----------------------------<  144<H>  4.3   |  20<L>  |  0.67    Ca    9.2      09 Jun 2018 07:24      PT/INR - ( 08 Jun 2018 04:38 )   PT: 11.2 SEC;   INR: 0.97          PTT - ( 08 Jun 2018 04:38 )  PTT:32.3 SEC  CAPILLARY BLOOD GLUCOSE                RADIOLOGY & ADDITIONAL TESTS:    Imaging Personally Reviewed:  [x] YES  [ ] NO    Consultant(s) Notes Reviewed:  [x] YES  [ ] NO      MEDICATIONS  (STANDING):  dextrose 5% + sodium chloride 0.45%. 1000 milliLiter(s) (75 mL/Hr) IV Continuous <Continuous>  heparin  Injectable 5000 Unit(s) SubCutaneous every 8 hours  lactobacillus acidophilus 1 Tablet(s) Oral two times a day with meals  levothyroxine 50 MICROGram(s) Oral daily  melatonin 3 milliGRAM(s) Oral at bedtime  melatonin      nystatin Powder 1 Application(s) Topical every 12 hours  oxyCODONE  ER Tablet 10 milliGRAM(s) Oral every 12 hours  polyethylene glycol 3350 17 Gram(s) Oral daily    MEDICATIONS  (PRN):  acetaminophen   Tablet. 650 milliGRAM(s) Oral every 6 hours PRN Mild Pain (1 - 3)  morphine  - Injectable 4 milliGRAM(s) IV Push every 4 hours PRN Severe Pain (7 - 10)  morphine  - Injectable 2 milliGRAM(s) IV Push every 4 hours PRN Moderate Pain (4 - 6)  ondansetron Injectable 4 milliGRAM(s) IV Push every 6 hours PRN Nausea and/or Vomiting      Care Discussed with Consultants/Other Providers [x] YES  [ ] NO    HEALTH ISSUES - PROBLEM Dx:  Prophylactic measure: Prophylactic measure  Hypothyroid: Hypothyroid  Ankle fracture, right, sequela: Ankle fracture, right, sequela

## 2018-06-09 NOTE — PROGRESS NOTE ADULT - ASSESSMENT
Assessment:   Patient is s/p Ankle Fracture ORIF. POD #1.    Plan:   - Pt seen and examined.  - Dressing CDI. No stirkethrough noted   - Continue non-weight bearing to RLE  - PT to work with patient to determine Rehab vs. Home discharge.     Discussed with attending:  [x] YES  [ ] NO

## 2018-06-10 LAB
ALBUMIN SERPL ELPH-MCNC: 3.7 G/DL — SIGNIFICANT CHANGE UP (ref 3.3–5)
ALP SERPL-CCNC: 111 U/L — SIGNIFICANT CHANGE UP (ref 40–120)
ALT FLD-CCNC: 9 U/L — SIGNIFICANT CHANGE UP (ref 4–33)
AST SERPL-CCNC: 17 U/L — SIGNIFICANT CHANGE UP (ref 4–32)
BASOPHILS # BLD AUTO: 0.02 K/UL — SIGNIFICANT CHANGE UP (ref 0–0.2)
BASOPHILS NFR BLD AUTO: 0.2 % — SIGNIFICANT CHANGE UP (ref 0–2)
BILIRUB SERPL-MCNC: 0.2 MG/DL — SIGNIFICANT CHANGE UP (ref 0.2–1.2)
BUN SERPL-MCNC: 10 MG/DL — SIGNIFICANT CHANGE UP (ref 7–23)
CALCIUM SERPL-MCNC: 8.7 MG/DL — SIGNIFICANT CHANGE UP (ref 8.4–10.5)
CHLORIDE SERPL-SCNC: 100 MMOL/L — SIGNIFICANT CHANGE UP (ref 98–107)
CO2 SERPL-SCNC: 23 MMOL/L — SIGNIFICANT CHANGE UP (ref 22–31)
CREAT SERPL-MCNC: 0.69 MG/DL — SIGNIFICANT CHANGE UP (ref 0.5–1.3)
EOSINOPHIL # BLD AUTO: 0.08 K/UL — SIGNIFICANT CHANGE UP (ref 0–0.5)
EOSINOPHIL NFR BLD AUTO: 0.8 % — SIGNIFICANT CHANGE UP (ref 0–6)
GLUCOSE SERPL-MCNC: 104 MG/DL — HIGH (ref 70–99)
HCT VFR BLD CALC: 30.5 % — LOW (ref 34.5–45)
HGB BLD-MCNC: 10.4 G/DL — LOW (ref 11.5–15.5)
IMM GRANULOCYTES # BLD AUTO: 0.04 # — SIGNIFICANT CHANGE UP
IMM GRANULOCYTES NFR BLD AUTO: 0.4 % — SIGNIFICANT CHANGE UP (ref 0–1.5)
LYMPHOCYTES # BLD AUTO: 3.17 K/UL — SIGNIFICANT CHANGE UP (ref 1–3.3)
LYMPHOCYTES # BLD AUTO: 33.3 % — SIGNIFICANT CHANGE UP (ref 13–44)
MAGNESIUM SERPL-MCNC: 1.9 MG/DL — SIGNIFICANT CHANGE UP (ref 1.6–2.6)
MCHC RBC-ENTMCNC: 29.4 PG — SIGNIFICANT CHANGE UP (ref 27–34)
MCHC RBC-ENTMCNC: 34.1 % — SIGNIFICANT CHANGE UP (ref 32–36)
MCV RBC AUTO: 86.2 FL — SIGNIFICANT CHANGE UP (ref 80–100)
MONOCYTES # BLD AUTO: 0.69 K/UL — SIGNIFICANT CHANGE UP (ref 0–0.9)
MONOCYTES NFR BLD AUTO: 7.3 % — SIGNIFICANT CHANGE UP (ref 2–14)
NEUTROPHILS # BLD AUTO: 5.51 K/UL — SIGNIFICANT CHANGE UP (ref 1.8–7.4)
NEUTROPHILS NFR BLD AUTO: 58 % — SIGNIFICANT CHANGE UP (ref 43–77)
NRBC # FLD: 0 — SIGNIFICANT CHANGE UP
PHOSPHATE SERPL-MCNC: 3.5 MG/DL — SIGNIFICANT CHANGE UP (ref 2.5–4.5)
PLATELET # BLD AUTO: 234 K/UL — SIGNIFICANT CHANGE UP (ref 150–400)
PMV BLD: 13.2 FL — HIGH (ref 7–13)
POTASSIUM SERPL-MCNC: 4.1 MMOL/L — SIGNIFICANT CHANGE UP (ref 3.5–5.3)
POTASSIUM SERPL-SCNC: 4.1 MMOL/L — SIGNIFICANT CHANGE UP (ref 3.5–5.3)
PROT SERPL-MCNC: 6.7 G/DL — SIGNIFICANT CHANGE UP (ref 6–8.3)
RBC # BLD: 3.54 M/UL — LOW (ref 3.8–5.2)
RBC # FLD: 13.2 % — SIGNIFICANT CHANGE UP (ref 10.3–14.5)
SODIUM SERPL-SCNC: 137 MMOL/L — SIGNIFICANT CHANGE UP (ref 135–145)
WBC # BLD: 9.51 K/UL — SIGNIFICANT CHANGE UP (ref 3.8–10.5)
WBC # FLD AUTO: 9.51 K/UL — SIGNIFICANT CHANGE UP (ref 3.8–10.5)

## 2018-06-10 RX ORDER — MORPHINE SULFATE 50 MG/1
1 CAPSULE, EXTENDED RELEASE ORAL ONCE
Qty: 0 | Refills: 0 | Status: DISCONTINUED | OUTPATIENT
Start: 2018-06-10 | End: 2018-06-10

## 2018-06-10 RX ORDER — OXYCODONE HYDROCHLORIDE 5 MG/1
10 TABLET ORAL EVERY 4 HOURS
Qty: 0 | Refills: 0 | Status: DISCONTINUED | OUTPATIENT
Start: 2018-06-10 | End: 2018-06-11

## 2018-06-10 RX ORDER — OXYCODONE HYDROCHLORIDE 5 MG/1
5 TABLET ORAL EVERY 4 HOURS
Qty: 0 | Refills: 0 | Status: DISCONTINUED | OUTPATIENT
Start: 2018-06-10 | End: 2018-06-11

## 2018-06-10 RX ADMIN — MORPHINE SULFATE 2 MILLIGRAM(S): 50 CAPSULE, EXTENDED RELEASE ORAL at 03:12

## 2018-06-10 RX ADMIN — OXYCODONE HYDROCHLORIDE 10 MILLIGRAM(S): 5 TABLET ORAL at 05:09

## 2018-06-10 RX ADMIN — OXYCODONE HYDROCHLORIDE 10 MILLIGRAM(S): 5 TABLET ORAL at 13:30

## 2018-06-10 RX ADMIN — MORPHINE SULFATE 1 MILLIGRAM(S): 50 CAPSULE, EXTENDED RELEASE ORAL at 05:36

## 2018-06-10 RX ADMIN — MORPHINE SULFATE 1 MILLIGRAM(S): 50 CAPSULE, EXTENDED RELEASE ORAL at 06:06

## 2018-06-10 RX ADMIN — OXYCODONE HYDROCHLORIDE 10 MILLIGRAM(S): 5 TABLET ORAL at 23:06

## 2018-06-10 RX ADMIN — HEPARIN SODIUM 5000 UNIT(S): 5000 INJECTION INTRAVENOUS; SUBCUTANEOUS at 13:12

## 2018-06-10 RX ADMIN — HEPARIN SODIUM 5000 UNIT(S): 5000 INJECTION INTRAVENOUS; SUBCUTANEOUS at 05:10

## 2018-06-10 RX ADMIN — Medication 1 TABLET(S): at 08:52

## 2018-06-10 RX ADMIN — OXYCODONE HYDROCHLORIDE 10 MILLIGRAM(S): 5 TABLET ORAL at 22:30

## 2018-06-10 RX ADMIN — OXYCODONE HYDROCHLORIDE 10 MILLIGRAM(S): 5 TABLET ORAL at 17:30

## 2018-06-10 RX ADMIN — HEPARIN SODIUM 5000 UNIT(S): 5000 INJECTION INTRAVENOUS; SUBCUTANEOUS at 22:18

## 2018-06-10 RX ADMIN — NYSTATIN CREAM 1 APPLICATION(S): 100000 CREAM TOPICAL at 05:10

## 2018-06-10 RX ADMIN — Medication 50 MICROGRAM(S): at 05:10

## 2018-06-10 RX ADMIN — Medication 3 MILLIGRAM(S): at 22:18

## 2018-06-10 RX ADMIN — OXYCODONE HYDROCHLORIDE 10 MILLIGRAM(S): 5 TABLET ORAL at 16:46

## 2018-06-10 RX ADMIN — Medication 1 TABLET(S): at 16:46

## 2018-06-10 RX ADMIN — NYSTATIN CREAM 1 APPLICATION(S): 100000 CREAM TOPICAL at 18:46

## 2018-06-10 RX ADMIN — MORPHINE SULFATE 2 MILLIGRAM(S): 50 CAPSULE, EXTENDED RELEASE ORAL at 02:42

## 2018-06-10 RX ADMIN — OXYCODONE HYDROCHLORIDE 10 MILLIGRAM(S): 5 TABLET ORAL at 05:39

## 2018-06-10 RX ADMIN — OXYCODONE HYDROCHLORIDE 10 MILLIGRAM(S): 5 TABLET ORAL at 12:46

## 2018-06-10 RX ADMIN — OXYCODONE HYDROCHLORIDE 10 MILLIGRAM(S): 5 TABLET ORAL at 18:46

## 2018-06-10 NOTE — PROGRESS NOTE ADULT - SUBJECTIVE AND OBJECTIVE BOX
Patient is a 37y old  Female who presents with a chief complaint of Ankle pain (02 Jun 2018 16:58)       INTERVAL HPI/OVERNIGHT EVENTS:  Patient seen and evaluated at bedside.  Pt is resting comfortable in NAD. Denies N/V/F/C.  Pain increased today with popliteal block wearing off yesterday evening.    Allergies    No Known Allergies    Intolerances        Vital Signs Last 24 Hrs  T(C): 36.3 (10 Nikita 2018 05:50), Max: 36.8 (09 Jun 2018 11:18)  T(F): 97.4 (10 Nikita 2018 05:50), Max: 98.3 (09 Jun 2018 11:18)  HR: 62 (10 Nikita 2018 05:50) (62 - 85)  BP: 121/74 (10 Nikita 2018 05:50) (113/54 - 138/79)  BP(mean): --  RR: 17 (10 Nikita 2018 05:50) (17 - 17)  SpO2: 98% (10 Nikita 2018 05:50) (97% - 99%)    LABS:                        10.4   9.51  )-----------( 234      ( 10 Nikita 2018 05:39 )             30.5     06-10    137  |  100  |  10  ----------------------------<  104<H>  4.1   |  23  |  0.69    Ca    8.7      10 Nikita 2018 05:39  Phos  3.5     06-10  Mg     1.9     06-10    TPro  6.7  /  Alb  3.7  /  TBili  0.2  /  DBili  x   /  AST  17  /  ALT  9   /  AlkPhos  111  06-10        CAPILLARY BLOOD GLUCOSE          Lower Extremity Physical Exam:  Dressing left clean dry intact today w/ splint    RADIOLOGY & ADDITIONAL TESTS:  < from: Xray Ankle Complete 3 Views, Right (06.08.18 @ 20:44) >    EXAM:  RAD ANKLE MIN 3 VIEWS RIGHT        PROCEDURE DATE:  Jun 8 2018         INTERPRETATION:  EXAMINATION: RAD ANKLE MIN 3 VIEWS RIGHT    CLINICAL INDICATION: postop    TECHNIQUE: 3 views right ankle    COMPARISON: 5/29/2018.    FINDINGS:     Right ankle postsurgical changes with thanks fibular surgical plate and   screws and 2 right-sided screws in the medial malleolus. Improving   alignment of radial and ulnar fractures. Overlying cast obscures fine   bony detail.    IMPRESSION:   Postoperative ankle with improving fracture alignment.                  MELBA AGEE M.D., ATTENDING RADIOLOGIST  This document has been electronically signed. Jun 9 2018 11:21AM                  < end of copied text >

## 2018-06-10 NOTE — PROGRESS NOTE ADULT - PROBLEM SELECTOR PLAN 1
c/w pain control with percocet and IV morphine PRN  PT/OT eval, nonweight bearing right ankle for 1 week.   s/p delta frame removal with ORIF of right ankle 6/8  TTE with known rheumatic mitral valve disease, mild to mod MR.  Med clearance in previous note. Ancef x 1 given.  Pt has been on Clindamycin for 10 days. will discontinue per podiatry.

## 2018-06-10 NOTE — PROGRESS NOTE ADULT - ASSESSMENT
37F R ankle fracture ORIF POD #2  - Pt seen and examined  - Pt increased pain today, anesthesia wore off yesterday evening  - Pain is at baseline, if persists tomorrow will consider removing splint to inspect surgical site but likely generalized pain  - Pt wants to go home if pain is controlled, will likely need rehab placement per PT previous recommendation  - Will discuss rehab vs home w/ home PT with medicine team tomorrow  - Splint left intact  - d/w attending

## 2018-06-10 NOTE — PROGRESS NOTE ADULT - SUBJECTIVE AND OBJECTIVE BOX
Patient is a 37y old  Female who presents with a chief complaint of Ankle pain (02 Jun 2018 16:58)      SUBJECTIVE / OVERNIGHT EVENTS:  anxious.  pain is still there.  no cp, no sob, no n/v/d. no abdominal pain.  no headache, no dizziness.       Vital Signs Last 24 Hrs  T(C): 36.6 (10 Nikita 2018 11:40), Max: 36.8 (09 Jun 2018 17:00)  T(F): 97.8 (10 Nikita 2018 11:40), Max: 98.2 (09 Jun 2018 17:00)  HR: 74 (10 Nikita 2018 11:40) (62 - 81)  BP: 118/59 (10 Nikita 2018 11:40) (118/59 - 138/79)  BP(mean): --  RR: 17 (10 Nikita 2018 11:40) (17 - 17)  SpO2: 97% (10 Nikita 2018 11:40) (97% - 99%)  I&O's Summary      PHYSICAL EXAM:  GENERAL: NAD, Comfortable  HEAD:  Atraumatic, Normocephalic  EYES: EOMI, PERRLA, conjunctiva and sclera clear  NECK: Supple, No JVD  CHEST/LUNG: Clear to auscultation bilaterally; No wheeze  HEART: Regular rate and rhythm; No murmurs, rubs, or gallops  ABDOMEN: Soft, Nontender, Nondistended; Bowel sounds present  Neuro: AAO x 3, no focal deficit, 5/5 b/l extremities  EXTREMITIES:  2+ Peripheral Pulses, No clubbing, cyanosis. (+) Right ankle dressing c/d/i  SKIN: No rashes or lesions      LABS:                        10.4   9.51  )-----------( 234      ( 10 Nikita 2018 05:39 )             30.5     06-10    137  |  100  |  10  ----------------------------<  104<H>  4.1   |  23  |  0.69    Ca    8.7      10 Nikita 2018 05:39  Phos  3.5     06-10  Mg     1.9     06-10    TPro  6.7  /  Alb  3.7  /  TBili  0.2  /  DBili  x   /  AST  17  /  ALT  9   /  AlkPhos  111  06-10      CAPILLARY BLOOD GLUCOSE                RADIOLOGY & ADDITIONAL TESTS:    Imaging Personally Reviewed:  [x] YES  [ ] NO    Consultant(s) Notes Reviewed:  [x] YES  [ ] NO      MEDICATIONS  (STANDING):  heparin  Injectable 5000 Unit(s) SubCutaneous every 8 hours  lactobacillus acidophilus 1 Tablet(s) Oral two times a day with meals  levothyroxine 50 MICROGram(s) Oral daily  melatonin 3 milliGRAM(s) Oral at bedtime  melatonin      nystatin Powder 1 Application(s) Topical every 12 hours  oxyCODONE  ER Tablet 10 milliGRAM(s) Oral every 12 hours  polyethylene glycol 3350 17 Gram(s) Oral daily    MEDICATIONS  (PRN):  acetaminophen   Tablet. 650 milliGRAM(s) Oral every 6 hours PRN Mild Pain (1 - 3)  ondansetron Injectable 4 milliGRAM(s) IV Push every 6 hours PRN Nausea and/or Vomiting  oxyCODONE    IR 5 milliGRAM(s) Oral every 4 hours PRN Moderate Pain (4 - 6)  oxyCODONE    IR 10 milliGRAM(s) Oral every 4 hours PRN Severe Pain (7 - 10)      Care Discussed with Consultants/Other Providers [x] YES  [ ] NO    HEALTH ISSUES - PROBLEM Dx:  Prophylactic measure: Prophylactic measure  Hypothyroid: Hypothyroid  Ankle fracture, right, sequela: Ankle fracture, right, sequela

## 2018-06-11 VITALS
DIASTOLIC BLOOD PRESSURE: 73 MMHG | HEART RATE: 81 BPM | OXYGEN SATURATION: 98 % | SYSTOLIC BLOOD PRESSURE: 117 MMHG | TEMPERATURE: 97 F | RESPIRATION RATE: 18 BRPM

## 2018-06-11 RX ORDER — LEVOTHYROXINE SODIUM 125 MCG
1 TABLET ORAL
Qty: 30 | Refills: 0
Start: 2018-06-11 | End: 2018-07-10

## 2018-06-11 RX ORDER — LEVOTHYROXINE SODIUM 125 MCG
1 TABLET ORAL
Qty: 0 | Refills: 0 | COMMUNITY

## 2018-06-11 RX ORDER — POLYETHYLENE GLYCOL 3350 17 G/17G
17 POWDER, FOR SOLUTION ORAL
Qty: 0 | Refills: 0 | DISCHARGE
Start: 2018-06-11

## 2018-06-11 RX ORDER — NYSTATIN CREAM 100000 [USP'U]/G
1 CREAM TOPICAL
Qty: 0 | Refills: 0 | DISCHARGE
Start: 2018-06-11

## 2018-06-11 RX ORDER — OXYCODONE HYDROCHLORIDE 5 MG/1
1 TABLET ORAL
Qty: 28 | Refills: 0
Start: 2018-06-11 | End: 2018-06-17

## 2018-06-11 RX ORDER — ACETAMINOPHEN 500 MG
2 TABLET ORAL
Qty: 0 | Refills: 0 | DISCHARGE
Start: 2018-06-11

## 2018-06-11 RX ADMIN — Medication 650 MILLIGRAM(S): at 15:40

## 2018-06-11 RX ADMIN — HEPARIN SODIUM 5000 UNIT(S): 5000 INJECTION INTRAVENOUS; SUBCUTANEOUS at 06:38

## 2018-06-11 RX ADMIN — OXYCODONE HYDROCHLORIDE 5 MILLIGRAM(S): 5 TABLET ORAL at 11:27

## 2018-06-11 RX ADMIN — Medication 50 MICROGRAM(S): at 06:38

## 2018-06-11 RX ADMIN — NYSTATIN CREAM 1 APPLICATION(S): 100000 CREAM TOPICAL at 06:37

## 2018-06-11 RX ADMIN — OXYCODONE HYDROCHLORIDE 10 MILLIGRAM(S): 5 TABLET ORAL at 07:30

## 2018-06-11 RX ADMIN — POLYETHYLENE GLYCOL 3350 17 GRAM(S): 17 POWDER, FOR SOLUTION ORAL at 12:27

## 2018-06-11 RX ADMIN — HEPARIN SODIUM 5000 UNIT(S): 5000 INJECTION INTRAVENOUS; SUBCUTANEOUS at 12:27

## 2018-06-11 RX ADMIN — Medication 1 TABLET(S): at 08:47

## 2018-06-11 RX ADMIN — OXYCODONE HYDROCHLORIDE 5 MILLIGRAM(S): 5 TABLET ORAL at 12:20

## 2018-06-11 RX ADMIN — OXYCODONE HYDROCHLORIDE 10 MILLIGRAM(S): 5 TABLET ORAL at 06:37

## 2018-06-11 RX ADMIN — Medication 650 MILLIGRAM(S): at 14:46

## 2018-06-11 NOTE — PROGRESS NOTE ADULT - ASSESSMENT
37F R ankle fracture ORIF POD #2  - Pt seen and examined  - Pain improved, more tolerable  - Pt expresses her preference to go home w/ home PT, states she has family assistance at home  - NWB to RLE with assistance  - F/u Dr. Sellers within 1 week of discharge, call [phone] for appointment 37F R ankle fracture ORIF POD #3  - Pt seen and examined  - Pain improved, more tolerable  - Pt expresses her preference to go home w/ home PT, states she has family assistance at home  - NWB to RLE with assistance  - F/u Dr. Sellers within 1 week of discharge, call [phone] for appointment

## 2018-06-11 NOTE — PROGRESS NOTE ADULT - SUBJECTIVE AND OBJECTIVE BOX
Patient is a 37y old  Female who presents with a chief complaint of Ankle pain (02 Jun 2018 16:58)       INTERVAL HPI/OVERNIGHT EVENTS:  Patient seen and evaluated at bedside.  Pt is resting comfortable in NAD. Denies N/V/F/C. Per pt pain is improving and more tolerable compared to over the weekend, has sensation to right foot.    Allergies    No Known Allergies    Intolerances        Vital Signs Last 24 Hrs  T(C): 36.6 (11 Jun 2018 06:24), Max: 36.7 (10 Nikita 2018 16:30)  T(F): 97.9 (11 Jun 2018 06:24), Max: 98.1 (10 Nikita 2018 16:30)  HR: 62 (11 Jun 2018 06:24) (62 - 74)  BP: 111/79 (11 Jun 2018 06:24) (111/79 - 124/68)  BP(mean): --  RR: 18 (11 Jun 2018 06:24) (17 - 18)  SpO2: 99% (11 Jun 2018 06:24) (97% - 99%)    LABS:                        10.4   9.51  )-----------( 234      ( 10 Nikita 2018 05:39 )             30.5     06-10    137  |  100  |  10  ----------------------------<  104<H>  4.1   |  23  |  0.69    Ca    8.7      10 Nikita 2018 05:39  Phos  3.5     06-10  Mg     1.9     06-10    TPro  6.7  /  Alb  3.7  /  TBili  0.2  /  DBili  x   /  AST  17  /  ALT  9   /  AlkPhos  111  06-10        CAPILLARY BLOOD GLUCOSE          Lower Extremity Physical Exam:  Dressing left clean dry intact today w/ splint    RADIOLOGY & ADDITIONAL TESTS:

## 2020-08-03 ENCOUNTER — EMERGENCY (EMERGENCY)
Facility: HOSPITAL | Age: 40
LOS: 1 days | Discharge: HOME | End: 2020-08-03
Admitting: HOSPITALIST
Payer: COMMERCIAL

## 2020-08-03 VITALS
TEMPERATURE: 98 F | HEART RATE: 60 BPM | OXYGEN SATURATION: 100 % | DIASTOLIC BLOOD PRESSURE: 83 MMHG | SYSTOLIC BLOOD PRESSURE: 126 MMHG | RESPIRATION RATE: 16 BRPM

## 2020-08-03 PROCEDURE — 73700 CT LOWER EXTREMITY W/O DYE: CPT | Mod: 26,RT

## 2020-08-03 PROCEDURE — 73610 X-RAY EXAM OF ANKLE: CPT | Mod: 26,RT

## 2020-08-03 PROCEDURE — 99284 EMERGENCY DEPT VISIT MOD MDM: CPT

## 2020-08-03 RX ORDER — IBUPROFEN 200 MG
600 TABLET ORAL ONCE
Refills: 0 | Status: COMPLETED | OUTPATIENT
Start: 2020-08-03 | End: 2020-08-03

## 2020-08-03 RX ADMIN — Medication 600 MILLIGRAM(S): at 02:41

## 2020-08-03 NOTE — ED PROVIDER NOTE - PATIENT PORTAL LINK FT
You can access the FollowMyHealth Patient Portal offered by [facility] by registering at the following website: [email or web address]. By joining [facility]’s FollowMyHealth portal, you will also be able to view your health information using other applications (apps) compatible with our system.

## 2020-08-03 NOTE — ED ADULT NURSE NOTE - OBJECTIVE STATEMENT
patient aaox3. ambulatory w/o assist. came in with ankle pain. hx of plate and screws in ankle. reports she missed the last step and felt forwards. denies loc, head trauma. no dislocation or contusion apparent. slight inner ankle swelling. skin warm and dry and intact. limited rom. able to bear weight. denies numbness, tingling. Will continue to monitor

## 2020-08-03 NOTE — ED PROVIDER NOTE - OBJECTIVE STATEMENT
41 y/o female pmh right ankle surgery + aury + screws placed 2 yrs ago, presents with c/o right ankle pain x 1 day s/p twistingit while falling, states that she can bear weight but with a lot of pain, has been taking tylenol with very little relief, denies any other injuries, numbness/tingling

## 2020-08-03 NOTE — ED ADULT TRIAGE NOTE - CHIEF COMPLAINT QUOTE
pt c/o R ankle pain and swelling since yesterday. pt ambulatory and bearing weight on leg. Hx of hardware to leg for previous injury 3 years ago.

## 2020-08-13 ENCOUNTER — EMERGENCY (EMERGENCY)
Facility: HOSPITAL | Age: 40
LOS: 1 days | Discharge: HOME | End: 2020-08-13
Attending: EMERGENCY MEDICINE | Admitting: EMERGENCY MEDICINE
Payer: COMMERCIAL

## 2020-08-13 VITALS
RESPIRATION RATE: 15 BRPM | DIASTOLIC BLOOD PRESSURE: 109 MMHG | SYSTOLIC BLOOD PRESSURE: 160 MMHG | TEMPERATURE: 98 F | HEART RATE: 88 BPM | OXYGEN SATURATION: 100 %

## 2020-08-13 LAB — SARS-COV-2 RNA SPEC QL NAA+PROBE: SIGNIFICANT CHANGE UP

## 2020-08-13 PROCEDURE — 99283 EMERGENCY DEPT VISIT LOW MDM: CPT

## 2020-08-13 RX ORDER — LEVOTHYROXINE SODIUM 125 MCG
1 TABLET ORAL
Qty: 30 | Refills: 0
Start: 2020-08-13 | End: 2020-09-11

## 2020-08-13 RX ORDER — CHOLECALCIFEROL (VITAMIN D3) 125 MCG
1 CAPSULE ORAL
Qty: 12 | Refills: 0
Start: 2020-08-13

## 2020-08-13 NOTE — ED PROVIDER NOTE - PATIENT PORTAL LINK FT
You can access the FollowMyHealth Patient Portal offered by [facility] by registering at the following website: [email or web address]. By joining [facility]’s FollowMyHealth portal, you will also be able to view your health information using other applications (apps) compatible with our system.

## 2020-08-13 NOTE — ED ADULT TRIAGE NOTE - CHIEF COMPLAINT QUOTE
Pt requesting covid testing for travel to see a sick family member.  Denies any physical complaints or covid symptoms.  PMHx:  hypothyroid, hyperlipidemia

## 2020-08-13 NOTE — ED PROVIDER NOTE - OBJECTIVE STATEMENT
41 y/o F with h/o hypothyroidism here for covid testing. 41 y/o F with h/o hypothyroidism here for covid testing.  Pt needs to emergently travel to [place] for family emergency, requesting covid testing as required prior to travel.  No symptoms.

## 2020-08-13 NOTE — ED PROVIDER NOTE - NSFOLLOWUPINSTRUCTIONS_ED_ALL_ED_FT
COVID-19 PCR testing takes 12-24 hours to result.  You will receive a text message and email with your results.  You can also call the emergency department at [phone] to request your results over the phone.

## 2020-08-13 NOTE — ED PROVIDER NOTE - CLINICAL SUMMARY MEDICAL DECISION MAKING FREE TEXT BOX
41 y/o F with h/o hypothyroidism here for covid testing and med refill for emergent travel.  Pt is asymptomatic, no complaints.  Will check covid pcr, 30 day rx for synthroid refilled.

## 2020-10-29 ENCOUNTER — OUTPATIENT (OUTPATIENT)
Dept: OUTPATIENT SERVICES | Facility: HOSPITAL | Age: 40
LOS: 1 days | End: 2020-10-29
Payer: COMMERCIAL

## 2020-10-29 VITALS
OXYGEN SATURATION: 100 % | WEIGHT: 184.97 LBS | TEMPERATURE: 98 F | HEART RATE: 59 BPM | SYSTOLIC BLOOD PRESSURE: 117 MMHG | HEIGHT: 63 IN | DIASTOLIC BLOOD PRESSURE: 79 MMHG | RESPIRATION RATE: 16 BRPM

## 2020-10-29 DIAGNOSIS — T84.223A DISPLACEMENT OF INTERNAL FIXATION DEVICE OF BONES OF FOOT AND TOES, INITIAL ENCOUNTER: ICD-10-CM

## 2020-10-29 DIAGNOSIS — T84.228A DISPLACEMENT OF INTERNAL FIXATION DEVICE OF OTHER BONES, INITIAL ENCOUNTER: ICD-10-CM

## 2020-10-29 DIAGNOSIS — Z01.818 ENCOUNTER FOR OTHER PREPROCEDURAL EXAMINATION: ICD-10-CM

## 2020-10-29 DIAGNOSIS — E03.9 HYPOTHYROIDISM, UNSPECIFIED: ICD-10-CM

## 2020-10-29 DIAGNOSIS — Z98.890 OTHER SPECIFIED POSTPROCEDURAL STATES: Chronic | ICD-10-CM

## 2020-10-29 PROCEDURE — G0463: CPT

## 2020-10-29 NOTE — H&P PST ADULT - NEGATIVE GENERAL SYMPTOMS
no chills/no fever/no polyphagia/no polyuria/no malaise/no polydipsia/no sweating/no anorexia/no weight loss/no fatigue

## 2020-10-29 NOTE — H&P PST ADULT - ASSESSMENT
39 y/o female with PMH of rheumatic fever (childhood), hypothyroidism and vitamin D deficiency is now diagnosed with displacement of internal fixation device of bones of foot and toes, initial encounter 41 y/o female with PMH of rheumatic fever (childhood), hypothyroidism and vitamin D deficiency diagnosed with displacement of internal fixation device of bones of foot and toes

## 2020-10-29 NOTE — H&P PST ADULT - NSICDXPASTMEDICALHX_GEN_ALL_CORE_FT
PAST MEDICAL HISTORY:  H/O: rheumatic fever     HLD (hyperlipidemia)     Hypothyroid     Mitral valve prolapse

## 2020-10-29 NOTE — H&P PST ADULT - NEGATIVE CARDIOVASCULAR SYMPTOMS
no dyspnea on exertion/no claudication/no chest pain/no palpitations/no orthopnea/no paroxysmal nocturnal dyspnea

## 2020-10-29 NOTE — H&P PST ADULT - HISTORY OF PRESENT ILLNESS
41 y/o female with PMH of rheumatic fever (childhood), hypothyroidism and vitamin D deficiency is now diagnosed with displacement of internal fixation device of bones of foot and toes, initial encounter. States she slipped and fell down 2 steps at home. She is scheduled for removal of deep right ankle implant 11/5/2020

## 2020-10-29 NOTE — H&P PST ADULT - PEDAL EDEMA TYPE
Patient:   NEETU STONE            MRN: [id number]            FIN: [id number]               Age:   62 years     Sex:  FEMALE     :  [birth year]   Associated Diagnoses:   None   Author:   MOHSIN-DO, SANA        PRIMARY CARE PHYSICIAN  Mike Rosenthal     REASON FOR VISIT/CHIEF COMPLAINT  CHest pain and SOB     HPI  This is a 61 yo female with h/o HTN, CHF, CAD s/p stents, DM, Afib oncoumadin, who comes in with c/o SOB and chest pain on exertion. She has had it for the last 2 weeks which has been progreessively getting worse.  she also c/o LE edema as well. She does c/o orthopnea.  No PND.  No palpitatins.  her chest pain is substernal and non radiating adn comes with exertion and goes away with rest. She describes it as sharp pain.  No numbness ortingling.  No dizziness or nausea or diaphoresis.  No V./D or abd pain.  She denies any FITCH or visual complaints.  She does have SOB as well which is exertional and at rest as well and gottent worse over the last 2 weeks. She denies any cough or wheezing.  Labs showed elevated BNP at 220, low K at 2.9 and trops at 0.6.  EKG shows no acute ischemic changes.  She has had elevated trops in the past.  Her cardiologist is Dr Pillai       PAST MEDICAL HISTORY  Atrial fibrillation / [id number] / Confirmed  CHF (congestive heart failure) / [id number] / Confirmed  Diabetes / [id number] / Confirmed  Fibroid, uterine / [id number] / Confirmed  Heart failure / [id number] / Confirmed  HTN - Hypertension / [id number] / Confirmed  Stent / [id number] / Confirmed  CAD        PAST SURGICAL HISTORY  inflammatory arthritis s/p arthroscopic irrigation/debridement/subtotal synovectomy  Partial hysterectomy (SNOMED CT 6592238653).  Angiogram (SNOMED CT 199729359)..            MEDICATIONS AT HOME  allopurinol oral 300 mg tablet 300 mg = 1 tab, Oral, Daily  amLODIPine oral 5 mg tablet 5 mg = 1 tab, Oral, Daily  aspirin oral  81 mg chewable tablet 81 mg = 1 tab, Oral, Daily  atorvastatin 80 mg, Oral, Daily  colchicine oral 0.6 mg tablet 0.6 mg = 1 tab, Oral, Daily  digoxin oral 125 mcg (0.125 mg) tablet 125 mcg = 1 tab, Oral, Daily  docusate-senna oral 50-8.6 mg tablet 1 tab, Oral, Daily  Eliquis oral 5 mg tablet 5 mg = 1 tab, Oral, Q12H  Lantus (insulin glargine) subcutaneous injection 100 unit/mL 42 units, Subcutaneous, BID  lisinopril oral 10 mg tablet 10 mg = 1 tab, Oral, QAM at 7  metoprolol tartrate oral 50 mg tablet 50 mg = 1 tab, Oral, BID  MiraLax oral powder for solution 17 gm, PRN, Oral, Daily  Norco oral 325-10 mg tablet 1 tab, PRN, Oral, Q6H           ALLERGIES     Allergies (1) Active Reaction  NKA None Documented      SOCIAL HISTORY  Alcohol  Details: Use: None.  Exercise  Details: Excercise: Never.  Home/Environment  Details: Alcohol Abuse in Household: No.  Substance Abuse in Household: No.  Smoker in Household: No.  Ambulation: Required Assistance.  Bathing: Independent.  Dressing: Independent.  Driving: Independent.  Eating: Independent.  Elimination: Independent.  Preparing Meal: Independent.  Taking Meds: Independent.  Toileting: Independent.  Transfers: Independent.  Assistive Devices Home: Glasses.  Details: Alcohol Abuse in Household: No.  Substance Abuse in Household: No.  Smoker in Household: No.  Patient Lives With: Spouse.  Ambulation: Independent.  Bathing: Independent.  Dressing: Independent.  Driving: Independent.  Eating: Independent.  Elimination: Independent.  Grooming: Independent.  Preparing Meal: Independent.  Taking Meds: Independent.  Toileting: Independent.  Substance Abuse  Details: Use: None.  Tobacco  Details: Smoked/Smokeless Tobacco Last 30 Days: No.  Use: Never smoker.  Details: Used in Last 12 Months: No.  Use: Former smoker.  Cultural/Taoism Practices  Details: Taoism or Cultural Practices: Catholic.  Taoism or Cultural Practices While in Hospital: No.         FAMILY HISTORY      MOTHER: Diabetes                                                                                                                                                                                                                                20-SEP-2015 22:15:15<$>; Stroke  GRANDMOTHER: Diabetes                                                                                                                                                                                                                                20-SEP-2015 22:15:15<$>; Myocardial infarction      REVIEW OF SYSTEMS  HEENT: Denies headache, auditory disturbances, nasal discharge, sinus tenderness, sore throat, dysphagia  EYES: No blurry vision, loss of vision, discharge o  NECK: No neck pain or tenderness, no swelling or masses, no neck stiffness  RESPIRATORY:  See HPI   CARDIOVASCULAR:  See HPI   GASTROINTESTINAL: Denies abd pain, nausea, vomiting, diarhhea, constipation.   GENITOURINARY: NO urinary frequency, urgency, hesitancy, burning, or obstruction  MUSCULOSKELETAL: No arthritis, arthralgias, myalgias, no stiffness, no swelling, no trauma  HEMATOLOGY/LYMPHACTICS: No swelling or nodes  NEUROLOGY: NO dizziness, no weakness, no sensory deficits, no numbness or tingling  ENDOCRINE: NO weight loss or weight gain, no thryoid disease or swelling, no chills, no hair loss  ALLERGIESIMMUNOLOGY: Negative unless noted in history and physical   PYSCHIATRIC: Denies depression, anxiety, insomnia, agitation, memory loss, negative unless noted in history and physical       PHYSICAL EXAMINATION     Vitals between:   17-MAY-2017 19:43:08   TO   18-MAY-2017 19:43:08                   LAST RESULT MINIMUM MAXIMUM  Temperature 36.3 36.3 36.3  Heart Rate 90 82 90  Respiratory Rate 18 15 18  NISBP           137 137 141  NIDBP           87 67 87  NIMBP           104 92 104  SpO2                    99 95 99  GEN: Sitting up comfortably in bed, no acute distress  HENT:  Normocephalic atraumatic, external ear normal, nasal septum midline, no pharyngeal erythema  EYES: EOMI, no scleral icterus or conjuctival erythema  NECK: Supple, no JVD or LAD  RESPIRATORY: Crackles b/l no wheezing   CARDIOVASCULAR: RRR no murmurs   GASTROINTESTINAL: Soft, non tender, non distended abdomen, no rebound or guarding, no organomegaly  GENITOURINARY: NO CVA tenderness appreciated  MUSCULOSKELETAL:Range of motion intact, no fractures, or bruising noted  NEUROLOGIC: Non-focal, alert and oriented to person, place and time   PSYCHIATRIC: Cooperative with appropriate mood and affect  Ext: 2+ edema b/l, no c/c  Skin: No rashes noted     LABS DATA     Labs between:  17-MAY-2017 19:43 to 18-MAY-2017 19:43    CBC:                 WBC  HgB  Hct  Plt  MCV  RDW   18-MAY-2017 9.0  (L) 11.5  (L) 35.4  340  88.7  (H) 15.5     DIFF:                 Seg  Neutroph//ABS  Lymph//ABS  Mono//ABS  EOS/ABS   18-MAY-2017 NOT APPLICABLE  59 // 5.3 31 // 2.8 7 // 0.6 3 // 0.3    BMP:                 Na  Cl  BUN  Glu   18-MAY-2017 144  103  17  65                              K  CO2  Cr  Ca                              (L) 2.9  30  0.90  9.1     CMP:                 AST  ALT  AlkPhos  Bili  Albumin   18-MAY-2017 13  14  97  0.7  3.8     Other Chem:             Mg  Phos  Triglycerides  GGTP  DirectBili                           1.8             COAG:                 INR  PT  PTT  Ddimer  Fibrinogen    18-MAY-2017 1.1  11.7  25                           Labs between:  17-MAY-2017 19:43 to 18-MAY-2017 19:43    CBC:                 WBC  HgB  Hct  Plt  MCV  RDW   18-MAY-2017 9.0  (L) 11.5  (L) 35.4  340  88.7  (H) 15.5     DIFF:                 Seg  Neutroph//ABS  Lymph//ABS  Mono//ABS  EOS/ABS   18-MAY-2017 NOT APPLICABLE  59 // 5.3 31 // 2.8 7 // 0.6 3 // 0.3    BMP:                 Na  Cl  BUN  Glu   18-MAY-2017 144  103  17  65                              K  CO2  Cr  Ca                              (L) 2.9  30  0.90  9.1     CMP:                  AST  ALT  AlkPhos  Bili  Albumin   18-MAY-2017 13  14  97  0.7  3.8     Other Chem:             Mg  Phos  Triglycerides  GGTP  DirectBili                           1.8             COAG:                 INR  PT  PTT  Ddimer  Fibrinogen    18-MAY-2017 1.1  11.7  25                                 ASSESMENT AND PLAN  1. Acute on chronic systolic exacerbation: BNP elevated to 220. ? etiology. Could be cardiac vs diet. Trops are mildly elevated.  Will trend cardiac enzymes.  Start lasix IV 40 mg BID, replace lytes and monitor I's and O's. Continue beta blocker and ace inhibitor.  Cards consulted, will check a 2D echo as well.  Await further cards recs.   2. CAD s/p stents: Elevated trosp: ? due to above vs demand.  Will check echo, trend cardiac enzymes. Continue ASA, statin and beta blocker.    3. Hypokalemia: Replace K and recheck in am. Will check mag levels as well.   4. Atrial fib: Rate controlled and in NSR right now.  CHADSVasc score of 6.  On eliquis and dig, will continue.   5. DM with nephropathy: Continue SSI fro now, and hold PO meds. Monitor BS   6. CKD stage 3: Creatinine at baseline, will continue to monitor   7. HLP: Continue statin  8. Gout: Stable, continue colchicine and allopurinol.   9. DVT Proph with SCD's and eliquis     CODE STATUS  Full     PLAN OF CARE  Discussed with patient  Expected to stay >2MN  PCP to be notified                                                                      Electronically Signed On 05/18/2017 20:45  __________________________________________________   MOHSIN-DO, SANA     pitting

## 2020-10-29 NOTE — H&P PST ADULT - RS GEN PE MLT RESP DETAILS PC
normal/breath sounds equal/respirations non-labored/airway patent/good air movement/clear to auscultation bilaterally/no intercostal retractions/no chest wall tenderness/no rhonchi/no wheezes/no rales/no subcutaneous emphysema

## 2020-10-29 NOTE — H&P PST ADULT - NSICDXFAMILYHX_GEN_ALL_CORE_FT
FAMILY HISTORY:  Father  Still living? Unknown  Family history of cancer in father, Age at diagnosis: Age Unknown    Mother  Still living? Unknown  Family history of diabetes mellitus, Age at diagnosis: Age Unknown

## 2020-10-29 NOTE — H&P PST ADULT - TRANSFUSION REACTION, PREVIOUS, PROFILE
-- DO NOT REPLY / DO NOT REPLY ALL --  -- Message is from the [facility]--    COVID-19 Universal Screening: Negative    Order Request  DME - Durable Medical Supply - Adult Diapers and Ensure     Message / reason: Patient's son in law is also requesting a call back. He is needing to discuss the patient current medical condition    Insurance type:   Payor: MEDICARE / Plan: PARTA AND B / Product Type: MEDICARE    Preferred Delivery Method   Call when ready for pickup - phone number to notify: [phone]     Caller Information       Type Contact Phone    06/15/2020 11:28 AM Phone (Incoming) RYAN NELSON (Emergency Contact) [phone]          Alternative phone number:     Turnaround time given to caller:   \"This message will be sent to [state Provider's name]. The clinical team will fulfill your request as soon as they review your message.\"   no

## 2020-10-29 NOTE — H&P PST ADULT - NSICDXPROBLEM_GEN_ALL_CORE_FT
PROBLEM DIAGNOSES  Problem: Displacement of internal fixation device of other bones, initial encounter  Assessment and Plan: Scheduled for removal of deep right ankle implant 11/5/2020. Preoperative instructions discussed and given to patient. Verbalized understanding of instructions. Encourged to keep appt for COVD screen      Problem: Hypothyroidism  Assessment and Plan: Instructed to take levothyroxine with a sip of water the morning of surgery       PROBLEM DIAGNOSES  Problem: Displacement of internal fixation device of other bones, initial encounter  Assessment and Plan: Scheduled for removal of deep right ankle implant 11/5/2020. Preoperative instructions discussed and given to patient. Verbalized understanding of instructions. Encourged to keep appt for COVID 19 test      Problem: Hypothyroidism  Assessment and Plan: Instructed to take levothyroxine with a sip of water the morning of surgery

## 2020-10-29 NOTE — H&P PST ADULT - HOW PATIENT ADDRESSED, PROFILE
Problem: Patient Care Overview  Goal: Plan of Care/Patient Progress Review  Pt A&Ox4. VSS on RA. Incontinent of bowels/ bladder, 1 BM this shift. C/o of anxiety, gave 1X PRN ativan. Per previous shift report, Mg replacement stopped half way through infusion because of patient complaint of IV pain. Mg recheck 1.9, MD notified and no interventions ordered at this time. On SSI,  & 163. Waiting for K & Phos lab results. Urine culture sensitivity still pending, Started macrobid. Will continue w/ poc.        Endy

## 2020-11-01 DIAGNOSIS — Z01.818 ENCOUNTER FOR OTHER PREPROCEDURAL EXAMINATION: ICD-10-CM

## 2020-11-02 ENCOUNTER — APPOINTMENT (OUTPATIENT)
Dept: DISASTER EMERGENCY | Facility: CLINIC | Age: 40
End: 2020-11-02

## 2020-11-03 LAB — SARS-COV-2 N GENE NPH QL NAA+PROBE: DETECTED

## 2021-04-21 NOTE — ED ADULT NURSE NOTE - TEMPLATE LIST FOR HEAD TO TOE ASSESSMENT
Impression: Dry eye syndrome of bilateral lacrimal glands: H04.123. Plan: Dry eyes account for the patient's complaints. There is no evidence of permanent changes to the cornea. Explained condition does not have a cure and will need artificial tears for maintenance. General

## 2021-06-07 ENCOUNTER — EMERGENCY (EMERGENCY)
Facility: HOSPITAL | Age: 41
LOS: 1 days | Discharge: HOME | End: 2021-06-07
Attending: EMERGENCY MEDICINE | Admitting: EMERGENCY MEDICINE
Payer: COMMERCIAL

## 2021-06-07 VITALS
TEMPERATURE: 98 F | SYSTOLIC BLOOD PRESSURE: 146 MMHG | HEART RATE: 74 BPM | WEIGHT: 179.9 LBS | OXYGEN SATURATION: 99 % | HEIGHT: 63 IN | RESPIRATION RATE: 18 BRPM | DIASTOLIC BLOOD PRESSURE: 80 MMHG

## 2021-06-07 VITALS
TEMPERATURE: 98 F | OXYGEN SATURATION: 98 % | HEART RATE: 72 BPM | RESPIRATION RATE: 18 BRPM | SYSTOLIC BLOOD PRESSURE: 136 MMHG | DIASTOLIC BLOOD PRESSURE: 88 MMHG

## 2021-06-07 DIAGNOSIS — Z98.890 OTHER SPECIFIED POSTPROCEDURAL STATES: Chronic | ICD-10-CM

## 2021-06-07 PROBLEM — Z86.79 PERSONAL HISTORY OF OTHER DISEASES OF THE CIRCULATORY SYSTEM: Chronic | Status: ACTIVE | Noted: 2020-10-29

## 2021-06-07 PROBLEM — I34.1 NONRHEUMATIC MITRAL (VALVE) PROLAPSE: Chronic | Status: ACTIVE | Noted: 2020-10-29

## 2021-06-07 LAB — S PYO DNA THROAT QL NAA+PROBE: SIGNIFICANT CHANGE UP

## 2021-06-07 PROCEDURE — 99283 EMERGENCY DEPT VISIT LOW MDM: CPT

## 2021-06-07 PROCEDURE — 87798 DETECT AGENT NOS DNA AMP: CPT

## 2021-06-07 PROCEDURE — 87651 STREP A DNA AMP PROBE: CPT

## 2021-06-07 RX ORDER — IBUPROFEN 200 MG
600 TABLET ORAL ONCE
Refills: 0 | Status: COMPLETED | OUTPATIENT
Start: 2021-06-07 | End: 2021-06-07

## 2021-06-07 RX ADMIN — Medication 600 MILLIGRAM(S): at 01:34

## 2021-06-07 NOTE — ED ADULT TRIAGE NOTE - CHIEF COMPLAINT QUOTE
My jaw hurts and all of my teeth and I feel like I have a lump on the left side of my neck. I took Ibuprofen 600mg about 8 hours ago with some relief

## 2021-06-07 NOTE — ED PROVIDER NOTE - OBJECTIVE STATEMENT
Patient developed sore throat a couple of days ago. Now pain is worse. Has difficulty speaking, swallowing. Pain in throat as well as left lower jaw. Feels a lump under her jaw. No fever. No cough. No vomiting. No pain with chewing

## 2021-06-07 NOTE — ED ADULT NURSE NOTE - OBJECTIVE STATEMENT
pt walked in c/o sore throat x couple of days, reports pain is worse now has difficulty swallowing and speaking.

## 2021-06-07 NOTE — ED ADULT NURSE NOTE - NS ED NURSE DC INFO COMPLEXITY
all other ROS negative except as per HPI Simple: Patient demonstrates quick and easy understanding/Straightforward: Basic instructions, no meds, no home treatment/Patient asked questions/Returned Demonstration/Verbalized Understanding

## 2021-06-07 NOTE — ED PROVIDER NOTE - CARE PROVIDER_API CALL
Frank Graves  OTOLARYNGOLOGY  [address] [place] [address], [address]  [place] [address]  Phone: [phone]  Fax: [phone]  Follow Up Time: 1-3 Days

## 2021-06-07 NOTE — ED PROVIDER NOTE - CLINICAL SUMMARY MEDICAL DECISION MAKING FREE TEXT BOX
Patient with throat pain. No visible swelling or exudate. Afebrile. Will get strep PCR and treat pain

## 2021-06-07 NOTE — ED PROVIDER NOTE - PATIENT PORTAL LINK FT
You can access the FollowMyHealth Patient Portal offered by [facility] by registering at the following website: [email or web address]. By joining [employer]’s FollowMyHealth portal, you will also be able to view your health information using other applications (apps) compatible with our system.

## 2022-06-26 NOTE — ED PROVIDER NOTE - CHIEF COMPLAINT
The patient is a 40y Female complaining of Brad: pt feeling better; possibly early diverticulitis; will send rx for augmentin for minor diverticulitis

## 2022-07-28 NOTE — ED PROVIDER NOTE - ENMT, MLM
Left message on machine for patient to call back.   Airway patent, Nasal mucosa clear. Mouth with normal mucosa. Throat has no vesicles, no oropharyngeal exudates and uvula is midline.

## 2023-04-14 ENCOUNTER — APPOINTMENT (OUTPATIENT)
Dept: OBGYN | Facility: CLINIC | Age: 43
End: 2023-04-14

## 2023-05-03 NOTE — H&P PST ADULT - MAMMOGRAM, LAST, PROFILE
Kiley Tello MD  05/02/23 [id number] 2020

## 2023-11-04 ENCOUNTER — EMERGENCY (EMERGENCY)
Facility: HOSPITAL | Age: 43
LOS: 1 days | Discharge: HOME | End: 2023-11-04
Attending: EMERGENCY MEDICINE | Admitting: EMERGENCY MEDICINE
Payer: MEDICAID

## 2023-11-04 VITALS
DIASTOLIC BLOOD PRESSURE: 58 MMHG | RESPIRATION RATE: 18 BRPM | OXYGEN SATURATION: 100 % | TEMPERATURE: 98 F | HEART RATE: 76 BPM | SYSTOLIC BLOOD PRESSURE: 102 MMHG

## 2023-11-04 VITALS
HEIGHT: 63 IN | OXYGEN SATURATION: 97 % | SYSTOLIC BLOOD PRESSURE: 116 MMHG | WEIGHT: 202.83 LBS | HEART RATE: 93 BPM | RESPIRATION RATE: 19 BRPM | TEMPERATURE: 97 F | DIASTOLIC BLOOD PRESSURE: 76 MMHG

## 2023-11-04 DIAGNOSIS — Z98.890 OTHER SPECIFIED POSTPROCEDURAL STATES: Chronic | ICD-10-CM

## 2023-11-04 LAB
ABO RH CONFIRMATION: SIGNIFICANT CHANGE UP
ABO RH CONFIRMATION: SIGNIFICANT CHANGE UP
ALBUMIN SERPL ELPH-MCNC: 3.5 G/DL — SIGNIFICANT CHANGE UP (ref 3.3–5)
ALBUMIN SERPL ELPH-MCNC: 3.5 G/DL — SIGNIFICANT CHANGE UP (ref 3.3–5)
ALP SERPL-CCNC: 124 U/L — HIGH (ref 30–120)
ALP SERPL-CCNC: 124 U/L — HIGH (ref 30–120)
ALT FLD-CCNC: 14 U/L — SIGNIFICANT CHANGE UP (ref 10–60)
ALT FLD-CCNC: 14 U/L — SIGNIFICANT CHANGE UP (ref 10–60)
ANION GAP SERPL CALC-SCNC: 8 MMOL/L — SIGNIFICANT CHANGE UP (ref 5–17)
ANION GAP SERPL CALC-SCNC: 8 MMOL/L — SIGNIFICANT CHANGE UP (ref 5–17)
APTT BLD: 32.5 SEC — SIGNIFICANT CHANGE UP (ref 24.5–35.6)
APTT BLD: 32.5 SEC — SIGNIFICANT CHANGE UP (ref 24.5–35.6)
AST SERPL-CCNC: 19 U/L — SIGNIFICANT CHANGE UP (ref 10–40)
AST SERPL-CCNC: 19 U/L — SIGNIFICANT CHANGE UP (ref 10–40)
BASOPHILS # BLD AUTO: 0.02 K/UL — SIGNIFICANT CHANGE UP (ref 0–0.2)
BASOPHILS # BLD AUTO: 0.02 K/UL — SIGNIFICANT CHANGE UP (ref 0–0.2)
BASOPHILS NFR BLD AUTO: 0.4 % — SIGNIFICANT CHANGE UP (ref 0–2)
BASOPHILS NFR BLD AUTO: 0.4 % — SIGNIFICANT CHANGE UP (ref 0–2)
BILIRUB SERPL-MCNC: 0.3 MG/DL — SIGNIFICANT CHANGE UP (ref 0.2–1.2)
BILIRUB SERPL-MCNC: 0.3 MG/DL — SIGNIFICANT CHANGE UP (ref 0.2–1.2)
BUN SERPL-MCNC: 13 MG/DL — SIGNIFICANT CHANGE UP (ref 7–23)
BUN SERPL-MCNC: 13 MG/DL — SIGNIFICANT CHANGE UP (ref 7–23)
CALCIUM SERPL-MCNC: 9.6 MG/DL — SIGNIFICANT CHANGE UP (ref 8.4–10.5)
CALCIUM SERPL-MCNC: 9.6 MG/DL — SIGNIFICANT CHANGE UP (ref 8.4–10.5)
CHLORIDE SERPL-SCNC: 103 MMOL/L — SIGNIFICANT CHANGE UP (ref 96–108)
CHLORIDE SERPL-SCNC: 103 MMOL/L — SIGNIFICANT CHANGE UP (ref 96–108)
CO2 SERPL-SCNC: 24 MMOL/L — SIGNIFICANT CHANGE UP (ref 22–31)
CO2 SERPL-SCNC: 24 MMOL/L — SIGNIFICANT CHANGE UP (ref 22–31)
CREAT SERPL-MCNC: 1.29 MG/DL — SIGNIFICANT CHANGE UP (ref 0.5–1.3)
CREAT SERPL-MCNC: 1.29 MG/DL — SIGNIFICANT CHANGE UP (ref 0.5–1.3)
EGFR: 53 ML/MIN/1.73M2 — LOW
EGFR: 53 ML/MIN/1.73M2 — LOW
EOSINOPHIL # BLD AUTO: 0.23 K/UL — SIGNIFICANT CHANGE UP (ref 0–0.5)
EOSINOPHIL # BLD AUTO: 0.23 K/UL — SIGNIFICANT CHANGE UP (ref 0–0.5)
EOSINOPHIL NFR BLD AUTO: 5 % — SIGNIFICANT CHANGE UP (ref 0–6)
EOSINOPHIL NFR BLD AUTO: 5 % — SIGNIFICANT CHANGE UP (ref 0–6)
GLUCOSE SERPL-MCNC: 145 MG/DL — HIGH (ref 70–99)
GLUCOSE SERPL-MCNC: 145 MG/DL — HIGH (ref 70–99)
HCT VFR BLD CALC: 23 % — LOW (ref 34.5–45)
HCT VFR BLD CALC: 23 % — LOW (ref 34.5–45)
HGB BLD-MCNC: 7.2 G/DL — LOW (ref 11.5–15.5)
HGB BLD-MCNC: 7.2 G/DL — LOW (ref 11.5–15.5)
IMM GRANULOCYTES NFR BLD AUTO: 0.2 % — SIGNIFICANT CHANGE UP (ref 0–0.9)
IMM GRANULOCYTES NFR BLD AUTO: 0.2 % — SIGNIFICANT CHANGE UP (ref 0–0.9)
INR BLD: 1.01 RATIO — SIGNIFICANT CHANGE UP (ref 0.85–1.18)
INR BLD: 1.01 RATIO — SIGNIFICANT CHANGE UP (ref 0.85–1.18)
LYMPHOCYTES # BLD AUTO: 1.54 K/UL — SIGNIFICANT CHANGE UP (ref 1–3.3)
LYMPHOCYTES # BLD AUTO: 1.54 K/UL — SIGNIFICANT CHANGE UP (ref 1–3.3)
LYMPHOCYTES # BLD AUTO: 33.3 % — SIGNIFICANT CHANGE UP (ref 13–44)
LYMPHOCYTES # BLD AUTO: 33.3 % — SIGNIFICANT CHANGE UP (ref 13–44)
MCHC RBC-ENTMCNC: 26.2 PG — LOW (ref 27–34)
MCHC RBC-ENTMCNC: 26.2 PG — LOW (ref 27–34)
MCHC RBC-ENTMCNC: 31.3 GM/DL — LOW (ref 32–36)
MCHC RBC-ENTMCNC: 31.3 GM/DL — LOW (ref 32–36)
MCV RBC AUTO: 83.6 FL — SIGNIFICANT CHANGE UP (ref 80–100)
MCV RBC AUTO: 83.6 FL — SIGNIFICANT CHANGE UP (ref 80–100)
MONOCYTES # BLD AUTO: 0.5 K/UL — SIGNIFICANT CHANGE UP (ref 0–0.9)
MONOCYTES # BLD AUTO: 0.5 K/UL — SIGNIFICANT CHANGE UP (ref 0–0.9)
MONOCYTES NFR BLD AUTO: 10.8 % — SIGNIFICANT CHANGE UP (ref 2–14)
MONOCYTES NFR BLD AUTO: 10.8 % — SIGNIFICANT CHANGE UP (ref 2–14)
NEUTROPHILS # BLD AUTO: 2.32 K/UL — SIGNIFICANT CHANGE UP (ref 1.8–7.4)
NEUTROPHILS # BLD AUTO: 2.32 K/UL — SIGNIFICANT CHANGE UP (ref 1.8–7.4)
NEUTROPHILS NFR BLD AUTO: 50.3 % — SIGNIFICANT CHANGE UP (ref 43–77)
NEUTROPHILS NFR BLD AUTO: 50.3 % — SIGNIFICANT CHANGE UP (ref 43–77)
NRBC # BLD: 0 /100 WBCS — SIGNIFICANT CHANGE UP (ref 0–0)
NRBC # BLD: 0 /100 WBCS — SIGNIFICANT CHANGE UP (ref 0–0)
PLATELET # BLD AUTO: 265 K/UL — SIGNIFICANT CHANGE UP (ref 150–400)
PLATELET # BLD AUTO: 265 K/UL — SIGNIFICANT CHANGE UP (ref 150–400)
POTASSIUM SERPL-MCNC: 3.8 MMOL/L — SIGNIFICANT CHANGE UP (ref 3.5–5.3)
POTASSIUM SERPL-MCNC: 3.8 MMOL/L — SIGNIFICANT CHANGE UP (ref 3.5–5.3)
POTASSIUM SERPL-SCNC: 3.8 MMOL/L — SIGNIFICANT CHANGE UP (ref 3.5–5.3)
POTASSIUM SERPL-SCNC: 3.8 MMOL/L — SIGNIFICANT CHANGE UP (ref 3.5–5.3)
PROT SERPL-MCNC: 6.9 G/DL — SIGNIFICANT CHANGE UP (ref 6–8.3)
PROT SERPL-MCNC: 6.9 G/DL — SIGNIFICANT CHANGE UP (ref 6–8.3)
PROTHROM AB SERPL-ACNC: 11.3 SEC — SIGNIFICANT CHANGE UP (ref 9.5–13)
PROTHROM AB SERPL-ACNC: 11.3 SEC — SIGNIFICANT CHANGE UP (ref 9.5–13)
RBC # BLD: 2.75 M/UL — LOW (ref 3.8–5.2)
RBC # BLD: 2.75 M/UL — LOW (ref 3.8–5.2)
RBC # FLD: 13.7 % — SIGNIFICANT CHANGE UP (ref 10.3–14.5)
RBC # FLD: 13.7 % — SIGNIFICANT CHANGE UP (ref 10.3–14.5)
SODIUM SERPL-SCNC: 135 MMOL/L — SIGNIFICANT CHANGE UP (ref 135–145)
SODIUM SERPL-SCNC: 135 MMOL/L — SIGNIFICANT CHANGE UP (ref 135–145)
WBC # BLD: 4.62 K/UL — SIGNIFICANT CHANGE UP (ref 3.8–10.5)
WBC # BLD: 4.62 K/UL — SIGNIFICANT CHANGE UP (ref 3.8–10.5)
WBC # FLD AUTO: 4.62 K/UL — SIGNIFICANT CHANGE UP (ref 3.8–10.5)
WBC # FLD AUTO: 4.62 K/UL — SIGNIFICANT CHANGE UP (ref 3.8–10.5)

## 2023-11-04 PROCEDURE — 99285 EMERGENCY DEPT VISIT HI MDM: CPT

## 2023-11-04 PROCEDURE — P9016: CPT

## 2023-11-04 PROCEDURE — 93010 ELECTROCARDIOGRAM REPORT: CPT

## 2023-11-04 PROCEDURE — 86901 BLOOD TYPING SEROLOGIC RH(D): CPT

## 2023-11-04 PROCEDURE — 36430 TRANSFUSION BLD/BLD COMPNT: CPT | Mod: XU

## 2023-11-04 PROCEDURE — 86900 BLOOD TYPING SEROLOGIC ABO: CPT

## 2023-11-04 PROCEDURE — 85730 THROMBOPLASTIN TIME PARTIAL: CPT

## 2023-11-04 PROCEDURE — 36415 COLL VENOUS BLD VENIPUNCTURE: CPT

## 2023-11-04 PROCEDURE — 86923 COMPATIBILITY TEST ELECTRIC: CPT

## 2023-11-04 PROCEDURE — 80053 COMPREHEN METABOLIC PANEL: CPT

## 2023-11-04 PROCEDURE — 86850 RBC ANTIBODY SCREEN: CPT

## 2023-11-04 PROCEDURE — 93005 ELECTROCARDIOGRAM TRACING: CPT

## 2023-11-04 PROCEDURE — 99285 EMERGENCY DEPT VISIT HI MDM: CPT | Mod: 25

## 2023-11-04 PROCEDURE — 85610 PROTHROMBIN TIME: CPT

## 2023-11-04 PROCEDURE — 85025 COMPLETE CBC W/AUTO DIFF WBC: CPT

## 2023-11-04 RX ORDER — LEVOTHYROXINE SODIUM 125 MCG
1 TABLET ORAL
Qty: 0 | Refills: 0 | DISCHARGE

## 2023-11-04 RX ORDER — LEVOTHYROXINE SODIUM 125 MCG
1 TABLET ORAL
Refills: 0 | DISCHARGE

## 2023-11-04 RX ORDER — FERROUS GLUCONATE 100 %
1 POWDER (GRAM) MISCELLANEOUS
Qty: 0 | Refills: 0 | DISCHARGE

## 2023-11-04 NOTE — ED PROVIDER NOTE - NSFOLLOWUPINSTRUCTIONS_ED_ALL_ED_FT
Follow up with OBGYN and your PCP. Return for chest pain, shortness of breath, fever, worsening condition.       Anemia    Comparison of blood with a normal amount of red blood cells to blood with fewer red blood cells when a person has anemia.   Anemia is a condition in which there are not enough red blood cells or hemoglobin in the blood. Hemoglobin is a substance in red blood cells that carries oxygen.    When you do not have enough red blood cells or hemoglobin (are anemic), your body cannot get enough oxygen, and your organs may not work properly. As a result, you may feel very tired or have other problems.    What are the causes?  Common causes of anemia include:  Excessive bleeding. Anemia can be caused by excessive bleeding inside or outside the body, including bleeding from the intestines or from heavy menstrual periods in females.  Poor nutrition.  Long-lasting (chronic) kidney, thyroid, and liver disease.  Bone marrow disorders, spleen problems, and blood disorders.  Cancer and treatments for cancer.  Human immunodeficiency virus (HIV) and acquired immunodeficiency syndrome (AIDS).  Infections, medicines, and autoimmune disorders that destroy red blood cells.  What are the signs or symptoms?  Symptoms of this condition include:  Minor weakness.  Dizziness.  Headache, or difficulties concentrating and sleeping.  Heartbeats that feel irregular or faster than normal (palpitations).  Shortness of breath, especially with exercise.  Pale skin, lips, and nails, or cold hands and feet.  Upset stomach (indigestion) and nausea.  Symptoms may occur suddenly or develop slowly. If your anemia is mild, you may not have symptoms.    How is this diagnosed?  This condition is diagnosed based on blood tests, your medical history, and a physical exam. In some cases, a test may be needed in which cells are removed from the soft tissue inside of a bone and looked at under a microscope (bone marrow biopsy). Your health care provider may also check your stool (feces) for blood and may do more testing to look for the cause of your bleeding.    Other tests may include:  Imaging tests, such as a CT scan or MRI.  A procedure to see inside your esophagus and stomach (endoscopy). The esophagus is the part of the body that moves food from your mouth to your stomach.  A procedure to see inside your colon and rectum (colonoscopy).  How is this treated?  Treatment for this condition depends on the cause. If you continue to lose a lot of blood, you may need to be treated at a hospital. Treatment may include:  Taking supplements of iron, vitamin B12, or folic acid.  Taking a hormone medicine (erythropoietin) that can help to stimulate red blood cell growth.  Receiving donated blood through an IV (blood transfusion). This may be needed if you lose a lot of blood.  Making changes to your diet.  Having surgery to remove your spleen.  Follow these instructions at home:  Take over-the-counter and prescription medicines only as told by your health care provider.  Take supplements only as told by your health care provider.  Follow any diet instructions that you were given by your health care provider.  Keep all follow-up visits. Your health care provider will want to recheck your blood tests.  Contact a health care provider if:  You develop new bleeding anywhere in the body.  You are very weak.  Get help right away if:  You are short of breath.  You have pain in your abdomen or chest.  You are dizzy or feel faint.  You have trouble concentrating.  You have bloody stools, black stools, or tarry stools.  You vomit repeatedly or you vomit up blood.  These symptoms may be an emergency. Get help right away. Call 911.  Do not wait to see if the symptoms will go away.  Do not drive yourself to the hospital.  Summary  Anemia is a condition in which you do not have enough red blood cells or enough of a substance in your red blood cells that carries oxygen.  Symptoms may occur suddenly or develop slowly.  If your anemia is mild, you may not have symptoms.  This condition is diagnosed with blood tests, a medical history, and a physical exam. Other tests may be needed.  Treatment for this condition depends on the cause of the anemia.  This information is not intended to replace advice given to you by your health care provider. Make sure you discuss any questions you have with your health care provider.

## 2023-11-04 NOTE — ED ADULT TRIAGE NOTE - STATUS:
"Anesthesia Post Evaluation    Patient: Linda Skelotn    Procedure(s) Performed: Procedure(s) (LRB):  CHOLECYSTECTOMY, LAPAROSCOPIC (N/A)    Final Anesthesia Type: general  Patient location during evaluation: PACU  Patient participation: Yes- Able to Participate  Level of consciousness: awake and alert  Post-procedure vital signs: reviewed and stable  Pain management: adequate  Airway patency: patent  PONV status at discharge: No PONV  Anesthetic complications: no      Cardiovascular status: blood pressure returned to baseline and hemodynamically stable  Respiratory status: unassisted and spontaneous ventilation  Hydration status: euvolemic  Follow-up not needed.        Visit Vitals  /65 (BP Location: Right arm, Patient Position: Lying)   Pulse 72   Temp 37.6 °C (99.6 °F) (Oral)   Resp 18   Ht 6' 3" (1.905 m)   Wt 120.3 kg (265 lb 3.4 oz)   SpO2 (!) 90%   BMI 33.15 kg/m²       Pain/Dereck Score: Pain Assessment Performed: Yes (6/5/2018  5:16 AM)  Presence of Pain: denies (6/4/2018  7:00 PM)      " Applied

## 2023-11-04 NOTE — ED PROVIDER NOTE - PATIENT PORTAL LINK FT
You can access the FollowMyHealth Patient Portal offered by  by registering at the following website: [email or web address]. By joining [employer]’s FollowMyHealth portal, you will also be able to view your health information using other applications (apps) compatible with our system.

## 2023-11-04 NOTE — ED PROVIDER NOTE - OBJECTIVE STATEMENT
43-year-old female with past medical history of rheumatic heart disease and a murmur presents today due to anemia and shortness of breath.  Patient saw her primary care yesterday in which had labs drawn and was found to be anemic with a hemoglobin of 7.4.  Patient reports that she had a prolonged menstruation from October 10 to approximately October 27.  Patient has an upcoming appointment with her OB/GYN.  Patient admits to some shortness of breath and general weakness.  Patient denies chest pain, abdominal pain, pelvic pain, dysuria, hematuria, hematochezia, melena, blood thinner use, or any other complaints.

## 2023-11-04 NOTE — ED PROVIDER NOTE - NS ED ATTENDING STATEMENT MOD
This was a shared visit with the ANNALISE. I reviewed and verified the documentation and independently performed the documented:

## 2023-11-04 NOTE — ED PROVIDER NOTE - CLINICAL SUMMARY MEDICAL DECISION MAKING FREE TEXT BOX
43-year-old female with past medical history of rheumatic heart disease and a murmur presents today due to anemia and shortness of breath.  Patient saw her primary care yesterday in which had labs drawn and was found to be anemic with a hemoglobin of 7.4.  Patient reports that she had a prolonged menstruation from October 10 to approximately October 27.  Patient has an upcoming appointment with her OB/GYN.  Patient admits to some shortness of breath and general weakness.  Patient denies chest pain, abdominal pain, pelvic pain, dysuria, hematuria, hematochezia, melena, blood thinner use, or any other complaints.    VSS Afebrile, NAD  HEENT - clear, pale conjunctiva.  PERRL EOMI  Neck supple  lungs clear  Cor S1S2 RR - MGR  Abd soft nontender, no mass or HSM, no rebound  Ext FROM intact, no edema  Neuro Intact, no deficits.  Skin Warm and dry no rash.    Imp- Weakness, anemia  Plan - CBC, if low HH will transfuse. Pt already has appt with OBGYN Dr Lopes next week for further workup and treatment.

## 2023-11-04 NOTE — ED ADULT NURSE NOTE - OBJECTIVE STATEMENT
43-year-old female c/c of low hemoglobin. patient reports 3 weeks of menstruation from 10/10 to 10/27 with heavy bleeding. LMP 10/27. A2.

## 2023-11-04 NOTE — ED ADULT NURSE NOTE - NSFALLRISKINTERV_ED_ALL_ED

## 2024-06-20 ENCOUNTER — APPOINTMENT (OUTPATIENT)
Dept: OBGYN | Facility: CLINIC | Age: 44
End: 2024-06-20
Payer: MEDICAID

## 2024-06-20 ENCOUNTER — LABORATORY RESULT (OUTPATIENT)
Age: 44
End: 2024-06-20

## 2024-06-20 VITALS
HEIGHT: 64 IN | DIASTOLIC BLOOD PRESSURE: 80 MMHG | SYSTOLIC BLOOD PRESSURE: 120 MMHG | BODY MASS INDEX: 34.15 KG/M2 | WEIGHT: 200 LBS

## 2024-06-20 DIAGNOSIS — Z12.39 ENCOUNTER FOR OTHER SCREENING FOR MALIGNANT NEOPLASM OF BREAST: ICD-10-CM

## 2024-06-20 DIAGNOSIS — N84.0 POLYP OF CORPUS UTERI: ICD-10-CM

## 2024-06-20 DIAGNOSIS — Z86.79 PERSONAL HISTORY OF OTHER DISEASES OF THE CIRCULATORY SYSTEM: ICD-10-CM

## 2024-06-20 DIAGNOSIS — Z86.39 PERSONAL HISTORY OF OTHER ENDOCRINE, NUTRITIONAL AND METABOLIC DISEASE: ICD-10-CM

## 2024-06-20 DIAGNOSIS — Z82.49 FAMILY HISTORY OF ISCHEMIC HEART DISEASE AND OTHER DISEASES OF THE CIRCULATORY SYSTEM: ICD-10-CM

## 2024-06-20 DIAGNOSIS — Z80.0 FAMILY HISTORY OF MALIGNANT NEOPLASM OF DIGESTIVE ORGANS: ICD-10-CM

## 2024-06-20 DIAGNOSIS — D64.9 ANEMIA, UNSPECIFIED: ICD-10-CM

## 2024-06-20 DIAGNOSIS — Z01.419 ENCOUNTER FOR GYNECOLOGICAL EXAMINATION (GENERAL) (ROUTINE) W/OUT ABNORMAL FINDINGS: ICD-10-CM

## 2024-06-20 PROCEDURE — 99386 PREV VISIT NEW AGE 40-64: CPT

## 2024-06-20 RX ORDER — TRIAMCINOLONE ACETONIDE 1 MG/G
0.1 OINTMENT TOPICAL
Qty: 1 | Refills: 1 | Status: DISCONTINUED | COMMUNITY
Start: 2017-01-23 | End: 2024-06-20

## 2024-06-20 RX ORDER — LEVOTHYROXINE SODIUM 88 UG/1
88 CAPSULE ORAL
Refills: 0 | Status: ACTIVE | COMMUNITY

## 2024-06-20 NOTE — HISTORY OF PRESENT ILLNESS
[Y] : Positive pregnancy history [Currently Active] : currently active [Men] : men [FreeTextEntry1] : this is a 44 yo  here for annual exa h/o menorrhagia and anemia:  starting on october started having heavy and long menses, menses in october lasted 17 days,  was seen by a gyn at end of the year who did an office hsc and was found to have a large polyp. pt did not follow up bc he was male and pt was uncomfortable, and also she had to leave to go to [facility] bc her father was sick (and he recently ). pt is s.p 2 admissions to [facility] and received blood transfusions twice, most recently in april. in [place] she received IV iron infusions and also b12 injections for anemia. having hot flashes, states her mother went through menopause early  PMH hypothyroid and rheumatic heart disease PSH tubal ligation NKDA meds: synthroid HDM last pap and mammo > 5 years ago  pt's daughter present [LMPDate] : 05/28/24 [PGHxTotal] : 4 [[facility] Center[facility]Living] : 2 [PGHxABSpont] : 2

## 2024-06-20 NOTE — DISCUSSION/SUMMARY
[FreeTextEntry1] : 42 yo  here for annual gyn visit, h/o anemia requiring blood transfusions and Iv iron infusion and uterine polyp - HCM: pap and mammo ordered - menorrhagia: h/o polyp, check sono, check labs, f/u 1week (or after workup is done)

## 2024-06-21 LAB
BASOPHILS # BLD AUTO: 0.04 K/UL
BASOPHILS NFR BLD AUTO: 0.7 %
EOSINOPHIL # BLD AUTO: 0.17 K/UL
EOSINOPHIL NFR BLD AUTO: 3.1 %
HCT VFR BLD CALC: 35.1 %
HGB BLD-MCNC: 10.9 G/DL
IMM GRANULOCYTES NFR BLD AUTO: 0.9 %
LYMPHOCYTES # BLD AUTO: 1.74 K/UL
LYMPHOCYTES NFR BLD AUTO: 31.6 %
MAN DIFF?: NORMAL
MCHC RBC-ENTMCNC: 26 PG
MCHC RBC-ENTMCNC: 31.1 GM/DL
MCV RBC AUTO: 83.8 FL
MONOCYTES # BLD AUTO: 0.48 K/UL
MONOCYTES NFR BLD AUTO: 8.7 %
NEUTROPHILS # BLD AUTO: 3.03 K/UL
NEUTROPHILS NFR BLD AUTO: 55 %
PLATELET # BLD AUTO: 205 K/UL
RBC # BLD: 4.19 M/UL
RBC # FLD: 26.8 %
WBC # FLD AUTO: 5.51 K/UL

## 2024-06-24 LAB
25(OH)D3 SERPL-MCNC: 16.9 NG/ML
ESTRADIOL SERPL-MCNC: 72 PG/ML
FOLATE SERPL-MCNC: 6.2 NG/ML
FSH SERPL-MCNC: 6 IU/L
PROLACTIN SERPL-MCNC: 9 NG/ML
TSH SERPL-ACNC: 4.43 UIU/ML
VIT B12 SERPL-MCNC: 294 PG/ML

## 2024-06-25 LAB
CYTOLOGY CVX/VAG DOC THIN PREP: ABNORMAL
TESTOST FREE SERPL-MCNC: 0.4 PG/ML
TESTOST SERPL-MCNC: 12.5 NG/DL

## 2024-06-25 RX ORDER — FERROUS SULFATE TAB EC 325 MG (65 MG FE EQUIVALENT) 325 (65 FE) MG
325 (65 FE) TABLET DELAYED RESPONSE ORAL TWICE DAILY
Qty: 180 | Refills: 0 | Status: ACTIVE | COMMUNITY
Start: 2024-06-25 | End: 1900-01-01

## 2024-06-25 RX ORDER — ASCORBIC ACID 500 MG
500 TABLET ORAL DAILY
Qty: 1 | Refills: 0 | Status: ACTIVE | COMMUNITY
Start: 2024-06-25 | End: 1900-01-01

## 2024-06-27 ENCOUNTER — APPOINTMENT (OUTPATIENT)
Dept: OBGYN | Facility: CLINIC | Age: 44
End: 2024-06-27

## 2024-07-03 ENCOUNTER — APPOINTMENT (OUTPATIENT)
Dept: MAMMOGRAPHY | Facility: CLINIC | Age: 44
End: 2024-07-03
Payer: MEDICAID

## 2024-07-03 ENCOUNTER — OUTPATIENT (OUTPATIENT)
Dept: OUTPATIENT SERVICES | Facility: HOSPITAL | Age: 44
LOS: 1 days | End: 2024-07-03
Payer: MEDICAID

## 2024-07-03 ENCOUNTER — RESULT REVIEW (OUTPATIENT)
Age: 44
End: 2024-07-03

## 2024-07-03 ENCOUNTER — APPOINTMENT (OUTPATIENT)
Dept: ULTRASOUND IMAGING | Facility: CLINIC | Age: 44
End: 2024-07-03

## 2024-07-03 DIAGNOSIS — D64.9 ANEMIA, UNSPECIFIED: ICD-10-CM

## 2024-07-03 DIAGNOSIS — Z98.890 OTHER SPECIFIED POSTPROCEDURAL STATES: Chronic | ICD-10-CM

## 2024-07-03 DIAGNOSIS — Z00.8 ENCOUNTER FOR OTHER GENERAL EXAMINATION: ICD-10-CM

## 2024-07-03 PROCEDURE — 77063 BREAST TOMOSYNTHESIS BI: CPT

## 2024-07-03 PROCEDURE — 77063 BREAST TOMOSYNTHESIS BI: CPT | Mod: 26

## 2024-07-03 PROCEDURE — 76830 TRANSVAGINAL US NON-OB: CPT

## 2024-07-03 PROCEDURE — 77067 SCR MAMMO BI INCL CAD: CPT

## 2024-07-03 PROCEDURE — 76856 US EXAM PELVIC COMPLETE: CPT | Mod: 26

## 2024-07-03 PROCEDURE — 76856 US EXAM PELVIC COMPLETE: CPT

## 2024-07-03 PROCEDURE — 77067 SCR MAMMO BI INCL CAD: CPT | Mod: 26

## 2024-07-03 PROCEDURE — 76830 TRANSVAGINAL US NON-OB: CPT | Mod: 26

## 2024-07-11 ENCOUNTER — APPOINTMENT (OUTPATIENT)
Dept: OBGYN | Facility: CLINIC | Age: 44
End: 2024-07-11
Payer: MEDICAID

## 2024-07-11 VITALS
WEIGHT: 200 LBS | SYSTOLIC BLOOD PRESSURE: 124 MMHG | DIASTOLIC BLOOD PRESSURE: 78 MMHG | BODY MASS INDEX: 34.15 KG/M2 | HEIGHT: 64 IN

## 2024-07-11 DIAGNOSIS — E55.9 VITAMIN D DEFICIENCY, UNSPECIFIED: ICD-10-CM

## 2024-07-11 DIAGNOSIS — N92.0 EXCESSIVE AND FREQUENT MENSTRUATION WITH REGULAR CYCLE: ICD-10-CM

## 2024-07-11 DIAGNOSIS — R93.89 ABNORMAL FINDINGS ON DIAGNOSTIC IMAGING OF OTHER SPECIFIED BODY STRUCTURES: ICD-10-CM

## 2024-07-11 DIAGNOSIS — N92.6 IRREGULAR MENSTRUATION, UNSPECIFIED: ICD-10-CM

## 2024-07-11 PROCEDURE — 99214 OFFICE O/P EST MOD 30 MIN: CPT

## 2024-07-11 RX ORDER — ERGOCALCIFEROL 1.25 MG/1
50000 CAPSULE ORAL
Qty: 6 | Refills: 0 | Status: ACTIVE | COMMUNITY
Start: 2024-07-11 | End: 1900-01-01

## 2024-08-03 ENCOUNTER — NON-APPOINTMENT (OUTPATIENT)
Age: 44
End: 2024-08-03

## 2024-08-06 ENCOUNTER — RESULT REVIEW (OUTPATIENT)
Age: 44
End: 2024-08-06

## 2024-08-06 ENCOUNTER — APPOINTMENT (OUTPATIENT)
Dept: OBGYN | Facility: HOSPITAL | Age: 44
End: 2024-08-06

## 2024-08-06 ENCOUNTER — OUTPATIENT (OUTPATIENT)
Dept: INPATIENT UNIT | Facility: HOSPITAL | Age: 44
LOS: 1 days | Discharge: HOME | End: 2024-08-06
Payer: MEDICAID

## 2024-08-06 ENCOUNTER — TRANSCRIPTION ENCOUNTER (OUTPATIENT)
Age: 44
End: 2024-08-06

## 2024-08-06 VITALS
HEIGHT: 63 IN | DIASTOLIC BLOOD PRESSURE: 66 MMHG | SYSTOLIC BLOOD PRESSURE: 120 MMHG | WEIGHT: 203.05 LBS | OXYGEN SATURATION: 100 % | TEMPERATURE: 98 F | HEART RATE: 61 BPM | RESPIRATION RATE: 16 BRPM

## 2024-08-06 VITALS
OXYGEN SATURATION: 100 % | TEMPERATURE: 98 F | HEART RATE: 74 BPM | DIASTOLIC BLOOD PRESSURE: 79 MMHG | SYSTOLIC BLOOD PRESSURE: 122 MMHG | RESPIRATION RATE: 16 BRPM

## 2024-08-06 DIAGNOSIS — Z98.51 TUBAL LIGATION STATUS: Chronic | ICD-10-CM

## 2024-08-06 DIAGNOSIS — E03.9 HYPOTHYROIDISM, UNSPECIFIED: ICD-10-CM

## 2024-08-06 DIAGNOSIS — R93.89 ABNORMAL FINDINGS ON DIAGNOSTIC IMAGING OF OTHER SPECIFIED BODY STRUCTURES: ICD-10-CM

## 2024-08-06 DIAGNOSIS — Z98.890 OTHER SPECIFIED POSTPROCEDURAL STATES: Chronic | ICD-10-CM

## 2024-08-06 DIAGNOSIS — N92.1 EXCESSIVE AND FREQUENT MENSTRUATION WITH IRREGULAR CYCLE: ICD-10-CM

## 2024-08-06 DIAGNOSIS — Z79.890 HORMONE REPLACEMENT THERAPY: ICD-10-CM

## 2024-08-06 DIAGNOSIS — N84.0 POLYP OF CORPUS UTERI: ICD-10-CM

## 2024-08-06 DIAGNOSIS — D50.0 IRON DEFICIENCY ANEMIA SECONDARY TO BLOOD LOSS (CHRONIC): ICD-10-CM

## 2024-08-06 LAB — HCG UR QL: NEGATIVE — SIGNIFICANT CHANGE UP

## 2024-08-06 PROCEDURE — 81025 URINE PREGNANCY TEST: CPT

## 2024-08-06 PROCEDURE — 88305 TISSUE EXAM BY PATHOLOGIST: CPT

## 2024-08-06 PROCEDURE — 88305 TISSUE EXAM BY PATHOLOGIST: CPT | Mod: 26

## 2024-08-06 PROCEDURE — 58563 HYSTEROSCOPY ABLATION: CPT

## 2024-08-06 RX ORDER — DEXTROSE MONOHYDRATE, SODIUM CHLORIDE, SODIUM LACTATE, CALCIUM CHLORIDE, MAGNESIUM CHLORIDE 1.5; 538; 448; 18.4; 5.08 G/100ML; MG/100ML; MG/100ML; MG/100ML; MG/100ML
1000 SOLUTION INTRAPERITONEAL
Refills: 0 | Status: DISCONTINUED | OUTPATIENT
Start: 2024-08-06 | End: 2024-08-06

## 2024-08-06 RX ORDER — FENTANYL CITRATE 1200 UG/1
25 LOZENGE ORAL; TRANSMUCOSAL
Refills: 0 | Status: DISCONTINUED | OUTPATIENT
Start: 2024-08-06 | End: 2024-08-06

## 2024-08-06 RX ORDER — OXYCODONE HYDROCHLORIDE 30 MG/1
5 TABLET ORAL ONCE
Refills: 0 | Status: DISCONTINUED | OUTPATIENT
Start: 2024-08-06 | End: 2024-08-06

## 2024-08-06 RX ORDER — ONDANSETRON HCL/PF 4 MG/2 ML
4 VIAL (ML) INJECTION ONCE
Refills: 0 | Status: DISCONTINUED | OUTPATIENT
Start: 2024-08-06 | End: 2024-08-06

## 2024-08-06 NOTE — BRIEF OPERATIVE NOTE - NSICDXBRIEFPROCEDURE_GEN_ALL_CORE_FT
PROCEDURES:  Hysteroscopy, with dilation and curettage 06-Aug-2024 15:47:51  Ellie Turcios  Hysteroscopy, with dilation and curettage of uterus and hydrothermal or NovaSure endometrial ablation 06-Aug-2024 15:48:05  Ellie Turcios

## 2024-08-06 NOTE — BRIEF OPERATIVE NOTE - TYPE OF ANESTHESIA
General Niacinamide Pregnancy And Lactation Text: These medications are considered safe during pregnancy.

## 2024-08-06 NOTE — BRIEF OPERATIVE NOTE - OPERATION/FINDINGS
uterine cavity with fluffy endometrial tissue, no evidence of polyp, novasure ablation performed with uterine cavity 5.5 cm, width measuring 2cm

## 2024-08-06 NOTE — ASU DISCHARGE PLAN (ADULT/PEDIATRIC) - NS MD DC FALL RISK RISK
For information on Fall & Injury Prevention, visit: [email or web address] OR  [email or web address] OR  https://www.cdc.gov/steadi/patient.html

## 2024-08-06 NOTE — ASU PATIENT PROFILE, ADULT - FALL HARM RISK - UNIVERSAL INTERVENTIONS
Bed in lowest position, wheels locked, appropriate side rails in place/Call bell, personal items and telephone in reach/Instruct patient to call for assistance before getting out of bed or chair/Non-slip footwear when patient is out of bed/[place] to call system/Physically safe environment - no spills, clutter or unnecessary equipment/Purposeful Proactive Rounding/Room/bathroom lighting operational, light cord in reach

## 2024-08-06 NOTE — BRIEF OPERATIVE NOTE - NSICDXBRIEFPREOP_GEN_ALL_CORE_FT
PRE-OP DIAGNOSIS:  Menorrhagia with irregular cycle 06-Aug-2024 15:48:19  Ellie Turcios  Uterine polyp 06-Aug-2024 15:48:29  Ellie Turcios  Anemia due to chronic blood loss 06-Aug-2024 15:48:41  Ellie Turcios

## 2024-08-06 NOTE — ASU DISCHARGE PLAN (ADULT/PEDIATRIC) - ACTIVITY LEVEL
Nothing in vagina for 2 weeks/No heavy lifting/Elevate extremity/Nothing per vagina/No tub baths/No douching/No tampons/No intercourse

## 2024-08-06 NOTE — ASU PREOP CHECKLIST - LATEX ALLERGY
Initiate Treatment: Lamisil 250mg QD for 2 weeks Plan: Recommend to take cold showers and to take antihistamines and lamisil 250mg QD Detail Level: Zone no Otc Regimen: Zyrtec at night

## 2024-08-06 NOTE — BRIEF OPERATIVE NOTE - NSICDXBRIEFPOSTOP_GEN_ALL_CORE_FT
POST-OP DIAGNOSIS:  Menorrhagia with irregular cycle 06-Aug-2024 15:48:55  Ellie Turcios  Anemia due to chronic blood loss 06-Aug-2024 15:49:17  Ellie Turcios

## 2024-08-08 LAB — SURGICAL PATHOLOGY STUDY: SIGNIFICANT CHANGE UP

## 2024-08-22 ENCOUNTER — APPOINTMENT (OUTPATIENT)
Dept: OBGYN | Facility: CLINIC | Age: 44
End: 2024-08-22
Payer: MEDICAID

## 2024-08-22 VITALS
DIASTOLIC BLOOD PRESSURE: 74 MMHG | SYSTOLIC BLOOD PRESSURE: 120 MMHG | HEIGHT: 64 IN | WEIGHT: 200 LBS | BODY MASS INDEX: 34.15 KG/M2

## 2024-08-22 DIAGNOSIS — Z98.890 OTHER SPECIFIED POSTPROCEDURAL STATES: ICD-10-CM

## 2024-08-22 PROCEDURE — 99213 OFFICE O/P EST LOW 20 MIN: CPT

## 2024-08-22 NOTE — PHYSICAL EXAM
[TextEntry] : General appearance well-appearing no acute distress  pt pointing to pain in between buttock on right upper aspect medial most aspect, no lesions or masses palpated, no skin chnages, no edea  Normal external genitalia  Speculum inserted normal-appearing vagina no lesions light pink mucusy discharge, cervix appears closed no lesions.   Bimanual exam performed. Anteverted uterus nontender no cervical motion tenderness no adnexal masses bilaterally, no adnexal tenderness bilaterally

## 2024-08-22 NOTE — HISTORY OF PRESENT ILLNESS
[FreeTextEntry1] : pt doing very well.  s/p hsc D&C nereida 8/6 denies fevers/chills reports back pain (really buttock pain) that started 3 days after her procedure. was unable to walk had some mucusy watery red staining, now much lighter, wears a pads but is not heavy pathology reviewed and intraoperative findings /events, copy of path given

## 2024-08-22 NOTE — DISCUSSION/SUMMARY
[FreeTextEntry1] : 45 yo h/o menorrhagia now s/p HSC and novasure uterine ablation reviewed surgery, if menorrhagia returns d/w pt will need hysterectomy f/u for annual 1 year

## 2024-10-17 PROBLEM — D64.9 ANEMIA, UNSPECIFIED: Chronic | Status: ACTIVE | Noted: 2024-07-24

## 2024-10-17 PROBLEM — N84.0 POLYP OF CORPUS UTERI: Chronic | Status: ACTIVE | Noted: 2024-07-24

## 2024-10-17 PROBLEM — N83.209 UNSPECIFIED OVARIAN CYST, UNSPECIFIED SIDE: Chronic | Status: ACTIVE | Noted: 2024-07-24

## 2024-10-17 PROBLEM — N28.1 CYST OF KIDNEY, ACQUIRED: Chronic | Status: ACTIVE | Noted: 2024-07-24

## 2024-10-17 PROBLEM — Z87.81 PERSONAL HISTORY OF (HEALED) TRAUMATIC FRACTURE: Chronic | Status: ACTIVE | Noted: 2024-07-24

## 2024-10-24 ENCOUNTER — APPOINTMENT (OUTPATIENT)
Dept: OBGYN | Facility: CLINIC | Age: 44
End: 2024-10-24
Payer: COMMERCIAL

## 2024-10-24 VITALS — DIASTOLIC BLOOD PRESSURE: 64 MMHG | SYSTOLIC BLOOD PRESSURE: 112 MMHG

## 2024-10-24 PROCEDURE — 99214 OFFICE O/P EST MOD 30 MIN: CPT

## 2024-10-24 RX ORDER — NORETHINDRONE 0.35 MG/1
0.35 TABLET ORAL DAILY
Qty: 3 | Refills: 3 | Status: ACTIVE | COMMUNITY
Start: 2024-10-24 | End: 1900-01-01

## 2024-11-18 ENCOUNTER — APPOINTMENT (OUTPATIENT)
Dept: OBGYN | Facility: CLINIC | Age: 44
End: 2024-11-18
Payer: COMMERCIAL

## 2024-11-18 VITALS
BODY MASS INDEX: 34.15 KG/M2 | SYSTOLIC BLOOD PRESSURE: 122 MMHG | WEIGHT: 200 LBS | HEIGHT: 64 IN | DIASTOLIC BLOOD PRESSURE: 74 MMHG

## 2024-11-18 DIAGNOSIS — N93.9 ABNORMAL UTERINE AND VAGINAL BLEEDING, UNSPECIFIED: ICD-10-CM

## 2024-11-18 DIAGNOSIS — N83.209 UNSPECIFIED OVARIAN CYST, UNSPECIFIED SIDE: ICD-10-CM

## 2024-11-18 PROCEDURE — 99215 OFFICE O/P EST HI 40 MIN: CPT

## 2024-11-19 NOTE — ED PROVIDER NOTE - HIV OFFER
Cardiac MRI results report faxed to Dr Espinoza at [facility] via [email or web address] fax [phone]    Previously Declined (within the last year)

## 2024-12-27 ENCOUNTER — APPOINTMENT (OUTPATIENT)
Dept: OBGYN | Facility: CLINIC | Age: 44
End: 2024-12-27
Payer: COMMERCIAL

## 2024-12-27 VITALS
BODY MASS INDEX: 35.34 KG/M2 | DIASTOLIC BLOOD PRESSURE: 80 MMHG | SYSTOLIC BLOOD PRESSURE: 130 MMHG | WEIGHT: 207 LBS | HEIGHT: 64 IN

## 2024-12-27 DIAGNOSIS — Z01.818 ENCOUNTER FOR OTHER PREPROCEDURAL EXAMINATION: ICD-10-CM

## 2024-12-27 PROCEDURE — 99214 OFFICE O/P EST MOD 30 MIN: CPT

## 2025-01-07 ENCOUNTER — OUTPATIENT (OUTPATIENT)
Dept: OUTPATIENT SERVICES | Facility: HOSPITAL | Age: 45
LOS: 1 days | End: 2025-01-07
Payer: COMMERCIAL

## 2025-01-07 VITALS
OXYGEN SATURATION: 100 % | DIASTOLIC BLOOD PRESSURE: 89 MMHG | HEART RATE: 67 BPM | TEMPERATURE: 98 F | RESPIRATION RATE: 18 BRPM | WEIGHT: 204.15 LBS | SYSTOLIC BLOOD PRESSURE: 129 MMHG | HEIGHT: 63 IN

## 2025-01-07 DIAGNOSIS — N93.9 ABNORMAL UTERINE AND VAGINAL BLEEDING, UNSPECIFIED: ICD-10-CM

## 2025-01-07 DIAGNOSIS — Z01.818 ENCOUNTER FOR OTHER PREPROCEDURAL EXAMINATION: ICD-10-CM

## 2025-01-07 DIAGNOSIS — Z98.890 OTHER SPECIFIED POSTPROCEDURAL STATES: Chronic | ICD-10-CM

## 2025-01-07 DIAGNOSIS — N83.209 UNSPECIFIED OVARIAN CYST, UNSPECIFIED SIDE: ICD-10-CM

## 2025-01-07 DIAGNOSIS — E03.9 HYPOTHYROIDISM, UNSPECIFIED: ICD-10-CM

## 2025-01-07 DIAGNOSIS — Z98.51 TUBAL LIGATION STATUS: Chronic | ICD-10-CM

## 2025-01-07 LAB
A1C WITH ESTIMATED AVERAGE GLUCOSE RESULT: 6.2 % — HIGH (ref 4–5.6)
ANION GAP SERPL CALC-SCNC: 9 MMOL/L — SIGNIFICANT CHANGE UP (ref 5–17)
APPEARANCE UR: CLEAR — SIGNIFICANT CHANGE UP
BILIRUB UR-MCNC: NEGATIVE — SIGNIFICANT CHANGE UP
BLD GP AB SCN SERPL QL: SIGNIFICANT CHANGE UP
BUN SERPL-MCNC: 14 MG/DL — SIGNIFICANT CHANGE UP (ref 7–23)
CALCIUM SERPL-MCNC: 8.8 MG/DL — SIGNIFICANT CHANGE UP (ref 8.4–10.5)
CHLORIDE SERPL-SCNC: 103 MMOL/L — SIGNIFICANT CHANGE UP (ref 96–108)
CO2 SERPL-SCNC: 25 MMOL/L — SIGNIFICANT CHANGE UP (ref 22–31)
COLOR SPEC: YELLOW — SIGNIFICANT CHANGE UP
CREAT SERPL-MCNC: 0.83 MG/DL — SIGNIFICANT CHANGE UP (ref 0.5–1.3)
DIFF PNL FLD: NEGATIVE — SIGNIFICANT CHANGE UP
EGFR: 89 ML/MIN/1.73M2 — SIGNIFICANT CHANGE UP
ESTIMATED AVERAGE GLUCOSE: 131 MG/DL — HIGH (ref 68–114)
GLUCOSE SERPL-MCNC: 132 MG/DL — HIGH (ref 70–99)
GLUCOSE UR QL: NEGATIVE MG/DL — SIGNIFICANT CHANGE UP
HCT VFR BLD CALC: 31.9 % — LOW (ref 34.5–45)
HGB BLD-MCNC: 10.5 G/DL — LOW (ref 11.5–15.5)
KETONES UR-MCNC: NEGATIVE MG/DL — SIGNIFICANT CHANGE UP
LEUKOCYTE ESTERASE UR-ACNC: NEGATIVE — SIGNIFICANT CHANGE UP
MCHC RBC-ENTMCNC: 27.7 PG — SIGNIFICANT CHANGE UP (ref 27–34)
MCHC RBC-ENTMCNC: 32.9 G/DL — SIGNIFICANT CHANGE UP (ref 32–36)
MCV RBC AUTO: 84.2 FL — SIGNIFICANT CHANGE UP (ref 80–100)
NITRITE UR-MCNC: NEGATIVE — SIGNIFICANT CHANGE UP
NRBC # BLD: 0 /100 WBCS — SIGNIFICANT CHANGE UP (ref 0–0)
PH UR: 6 — SIGNIFICANT CHANGE UP (ref 5–8)
PLATELET # BLD AUTO: 213 K/UL — SIGNIFICANT CHANGE UP (ref 150–400)
POTASSIUM SERPL-MCNC: 3.8 MMOL/L — SIGNIFICANT CHANGE UP (ref 3.5–5.3)
POTASSIUM SERPL-SCNC: 3.8 MMOL/L — SIGNIFICANT CHANGE UP (ref 3.5–5.3)
PROT UR-MCNC: NEGATIVE MG/DL — SIGNIFICANT CHANGE UP
RBC # BLD: 3.79 M/UL — LOW (ref 3.8–5.2)
RBC # FLD: 12.8 % — SIGNIFICANT CHANGE UP (ref 10.3–14.5)
SODIUM SERPL-SCNC: 137 MMOL/L — SIGNIFICANT CHANGE UP (ref 135–145)
SP GR SPEC: 1.01 — SIGNIFICANT CHANGE UP (ref 1–1.03)
UROBILINOGEN FLD QL: 0.2 MG/DL — SIGNIFICANT CHANGE UP (ref 0.2–1)
WBC # BLD: 5.79 K/UL — SIGNIFICANT CHANGE UP (ref 3.8–10.5)
WBC # FLD AUTO: 5.79 K/UL — SIGNIFICANT CHANGE UP (ref 3.8–10.5)

## 2025-01-07 PROCEDURE — G0463: CPT

## 2025-01-07 PROCEDURE — 93010 ELECTROCARDIOGRAM REPORT: CPT | Mod: NC

## 2025-01-07 PROCEDURE — 85027 COMPLETE CBC AUTOMATED: CPT

## 2025-01-07 PROCEDURE — 81003 URINALYSIS AUTO W/O SCOPE: CPT

## 2025-01-07 PROCEDURE — 36415 COLL VENOUS BLD VENIPUNCTURE: CPT

## 2025-01-07 PROCEDURE — 93005 ELECTROCARDIOGRAM TRACING: CPT

## 2025-01-07 PROCEDURE — 87086 URINE CULTURE/COLONY COUNT: CPT

## 2025-01-07 PROCEDURE — 86900 BLOOD TYPING SEROLOGIC ABO: CPT

## 2025-01-07 PROCEDURE — 80048 BASIC METABOLIC PNL TOTAL CA: CPT

## 2025-01-07 PROCEDURE — 86850 RBC ANTIBODY SCREEN: CPT

## 2025-01-07 PROCEDURE — 83036 HEMOGLOBIN GLYCOSYLATED A1C: CPT

## 2025-01-07 PROCEDURE — 86901 BLOOD TYPING SEROLOGIC RH(D): CPT

## 2025-01-07 NOTE — H&P PST ADULT - PROBLEM SELECTOR PLAN 1
Scheduled for robotic total laparoscopic hysterectomy, bilateral salpingectomy, possible laparotomy, cystoscopy with Dr Jordan on 01/10/2025  Pre op instructions reviewed with patient and daughter for Radha interpretation and verbalized understanding.  CBC, BMP, HgbA1C, T&S, UA, urine culture and EKG pending.  NPO after midnight night before procedure.  May take levothyroxine AM of procedure with small sip of water.  To stop all ASA, NSAIDs, vitamins and supplements 1 week prior to procedure.  Chlorhexidene jaqueline given with instructions

## 2025-01-07 NOTE — H&P PST ADULT - BP NONINVASIVE SYSTOLIC (MM HG)
[id number] [facility] at [facility]  [phone]    Hematology / Oncology Established Visit    Reason for Visit:   Tiff Goodrich is a 64 y.o. female who is seen in consultation at the request of Dr. Alex Rivero for evaluation of hemochromatosis. History of Present Illness:   Ms. Eduin Pascal is a 63 y/o female with osteopenia, chronic knee pain who comes in for evaluation of hereditary hemochromatosis. Her daughter, Jeannette Lind, was recently diagnosed with hemochromatosis. No recent or recurrent infections. She does have R knee pain and arthritis. She also states she is struggling with her weight. She drinks 1 beer/week.    -Labs 2/21/18: Ferritin 110, Transferring 176 L, iron 145, TIBC 263, iron sat 55.1% H. Hep C Ab neg. BUN 12, Cr 0.9, Alb 4.4, AST 24, ALT 25, total bili 0.9, Hgb A1c 5.3, WBC 5.8, Hgb 13.5, Hct 39.5, .  -Fasting labs 3/15/18: Iron 133, TIBC 209 L, Iron sat 64%. 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 Liver ultrasound is normal, but liver MRI is more sensitive for iron overload. Today, patient comes in for follow up after evaluation by Cardiology and Hepatology. Cardiac workup revealed aortic sclerosis  Murmur, normal EF on echo, mildly dilated ascending aorta unchanged from Echo of 2015. Hepatologist Dr. Aldana Postal at [address] [place] [address] recommended annual lab testing and no need for liver MRI or biopsy at this time. No phlebotomy needed since ferritin < 500. History reviewed. No pertinent past medical history.    Past Surgical History:   Procedure Laterality Date    BREAST SURGERY PROCEDURE UNLISTED      HX BREAST BIOPSY Left     benign    HX CYST INCISION AND DRAINAGE      not sure of side-benign      Social History   Substance Use Topics    Smoking status: Never Smoker    Smokeless tobacco: Never Used    Alcohol use 1.2 oz/week     1 Glasses of wine, 1 Cans of beer per week   Drinks 1 beer / week. Tobacco, light use in her 25s for 2 yrs. , has 3 daughters. Family History   Problem Relation Age of Onset    Hypertension Mother     Heart Disease Father     Hypertension Father      Current Outpatient Prescriptions   Medication Sig    multivitamin (ONE A DAY) tablet Take 1 Tab by mouth daily. No current facility-administered medications for this visit. No Known Allergies     Review of Systems: A complete review of systems was obtained, negative except as described above. Physical Exam:     Visit Vitals    /89 (BP 1 Location: Left arm, BP Patient Position: Sitting)    Pulse (!) 59    Temp 96.4 °F (35.8 °C) (Oral)    Ht 5' 7.5\" (1.715 m)    Wt 204 lb 6.4 oz (92.7 kg)    SpO2 98%    BMI 31.54 kg/m2     ECOG PS: 0  General: Well developed, no acute distress  Eyes: PERRLA, EOMI, anicteric sclerae  HENT: Atraumatic, OP clear, TMs intact without erythema  Neck: Supple  Lymphatic: No cervical, supraclavicular, axillary or inguinal adenopathy  Respiratory: CTAB, normal respiratory effort  CV: Normal rate, regular rhythm, no murmurs, no peripheral edema  GI: Soft, nontender, nondistended, no masses, no hepatomegaly, no splenomegaly  MS: Normal gait and station. Digits without clubbing or cyanosis. Skin: No rashes, ecchymoses, or petechiae. Normal temperature, turgor, and texture. Neuro/Psych: Alert, oriented. 5/5 strength in all 4 extremities. Appropriate affect, normal judgment/insight. Results:     Lab Results   Component Value Date/Time    WBC 3.8 06/18/2018 07:39 AM    HGB 14.0 06/18/2018 07:39 AM    HCT 43.6 06/18/2018 07:39 AM    PLATELET 487 57/55/6085 07:39 AM    MCV 98 (H) 06/18/2018 07:39 AM    ABS.  NEUTROPHILS 1.9 06/18/2018 07:39 AM     Lab Results   Component Value Date/Time    Sodium 137 06/18/2018 07:39 AM    Potassium 4.4 06/18/2018 07:39 AM    Chloride 100 06/18/2018 07:39 AM    CO2 21 06/18/2018 07:39 AM    Glucose 96 06/18/2018 07:39 AM    BUN 10 06/18/2018 07:39 AM    Creatinine 0.76 06/18/2018 07:39 AM    GFR est AA 98 06/18/2018 07:39 AM    GFR est non-AA 85 06/18/2018 07:39 AM    Calcium 8.8 06/18/2018 07:39 AM     Lab Results   Component Value Date/Time    Bilirubin, total 0.5 06/18/2018 07:39 AM    ALT (SGPT) 19 06/18/2018 07:39 AM    AST (SGOT) 23 06/18/2018 07:39 AM    Alk. phosphatase 82 06/18/2018 07:39 AM    Protein, total 6.5 06/18/2018 07:39 AM    Albumin 4.3 06/18/2018 07:39 AM     Lab Results   Component Value Date/Time    Iron 133 06/18/2018 07:39 AM    TIBC 229 (L) 06/18/2018 07:39 AM    Iron % saturation 58 (H) 06/18/2018 07:39 AM    Ferritin 191 (H) 06/18/2018 07:39 AM     Lab Results   Component Value Date/Time    Ferritin 191 (H) 06/18/2018 07:39 AM         Imaging:     Liver ultrasound is normal, but liver MRI is more sensitive for iron overload. Assessment & Plan:   Zane Lyons is a 64 y.o. female comes in for evaluation of hereditary hemochromatosis. 1. 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 Inheritence is recessive. All offspring will at least be carriers. This mutatation affects 1 in 200-250 individuals of northern  origin, particularly Nordic or Celtic ancestry. Approx 1% of women and 25% of men with C282Y homozygosity have definite disease manifestations. At this time, the normal liver ultrasound and LFTs along with high iron % sat and homozygous C282Y finding indicate that you have iron overload, but no clear organ dsyfunction. As the ferritin is < 500 and iron sat < 60%, I do not recommend phlebotomy at this time. I do recommend monitoring the iron profile, ferritin, and hepatic panel once a year.  Pt was evaluated by Dr. Pat Horn at [place] [address] who agrees with annual monitoring, no need for phlebotomy and states no need for liver MRI. -- PCP should screen for diabetes if not done already. -- No phlebotomy recommended at this time given ferritin < 500.    -- Avoid iron by not using cast iron cooking products, avoid / minimize red meat, minimize alcohol intake particularly red wine  -- Recommend annual testing of iron profile, ferritin, hepatic panel. Please refer back to me (Hematology) and Hepatology if ferritin approaches 500 and iron sat > 60%. -- Patient knows to return as needed. 2. Family h/o hereditary hemochromatosis: Daughter has homozygous C282Y genotype. 3. Elevated BP: BP elevated again today, but has been normal at other doctor visits recently. No h/o HTN. I appreciate the opportunity to participate in Ms. Nino Santana's care.     Signed By: Francia Pagna MD     June 21, 2018 129

## 2025-01-07 NOTE — H&P PST ADULT - NSICDXPASTSURGICALHX_GEN_ALL_CORE_FT
PAST SURGICAL HISTORY:  H/O tubal ligation     History of D&C     History of removal of retained hardware     S/P ORIF (open reduction internal fixation) fracture right ankle 6/2018

## 2025-01-07 NOTE — H&P PST ADULT - NEGATIVE ENMT SYMPTOMS
no hearing difficulty/no ear pain/no nasal congestion/no nasal discharge/no nasal obstruction/no post-nasal discharge/no nose bleeds

## 2025-01-07 NOTE — H&P PST ADULT - NSANTHOSAYNRD_GEN_A_CORE
neck 16 inches/No. CHARISSA screening performed.  STOP BANG Legend: 0-2 = LOW Risk; 3-4 = INTERMEDIATE Risk; 5-8 = HIGH Risk

## 2025-01-07 NOTE — H&P PST ADULT - NSICDXPASTMEDICALHX_GEN_ALL_CORE_FT
PAST MEDICAL HISTORY:  Anemia     Endometrial polyp     H/O: rheumatic fever     History of fracture of right ankle     HLD (hyperlipidemia)     Hypothyroid     Mitral valve prolapse     Ovarian cyst     Renal cyst

## 2025-01-07 NOTE — H&P PST ADULT - HISTORY OF PRESENT ILLNESS
45 y/o female with PMH of hypothyroidism and anemia presents for PS, accompanied by daughter- patient Radha speaking but also speaks and understands English- daughter used for interpretation as needed.  C/o heavy menstrual cycles resulting in anemia, required 3 blood transfusion, last summer,  had a D&C in August 2024 with small improvement of symptoms.  Otherwise feeling well at [facility] today with no recent cough fever or acute illness.  Scheduled for robotic total laparoscopic hysterectomy, bilateral salpingectomy, possible laparotomy, cystoscopy with Dr Jordan on 01/10/2025

## 2025-01-08 ENCOUNTER — NON-APPOINTMENT (OUTPATIENT)
Age: 45
End: 2025-01-08

## 2025-01-08 ENCOUNTER — RESULT REVIEW (OUTPATIENT)
Age: 45
End: 2025-01-08

## 2025-01-08 ENCOUNTER — OUTPATIENT (OUTPATIENT)
Dept: OUTPATIENT SERVICES | Facility: HOSPITAL | Age: 45
LOS: 1 days | End: 2025-01-08
Payer: MEDICAID

## 2025-01-08 ENCOUNTER — APPOINTMENT (OUTPATIENT)
Dept: ULTRASOUND IMAGING | Facility: CLINIC | Age: 45
End: 2025-01-08
Payer: COMMERCIAL

## 2025-01-08 DIAGNOSIS — Z98.890 OTHER SPECIFIED POSTPROCEDURAL STATES: Chronic | ICD-10-CM

## 2025-01-08 DIAGNOSIS — N83.209 UNSPECIFIED OVARIAN CYST, UNSPECIFIED SIDE: ICD-10-CM

## 2025-01-08 DIAGNOSIS — Z98.51 TUBAL LIGATION STATUS: Chronic | ICD-10-CM

## 2025-01-08 DIAGNOSIS — N93.9 ABNORMAL UTERINE AND VAGINAL BLEEDING, UNSPECIFIED: ICD-10-CM

## 2025-01-08 LAB
CULTURE RESULTS: SIGNIFICANT CHANGE UP
SPECIMEN SOURCE: SIGNIFICANT CHANGE UP

## 2025-01-08 PROCEDURE — 76830 TRANSVAGINAL US NON-OB: CPT

## 2025-01-08 PROCEDURE — 76856 US EXAM PELVIC COMPLETE: CPT | Mod: 26

## 2025-01-08 PROCEDURE — 76830 TRANSVAGINAL US NON-OB: CPT | Mod: 26

## 2025-01-08 PROCEDURE — 76856 US EXAM PELVIC COMPLETE: CPT

## 2025-01-09 ENCOUNTER — APPOINTMENT (OUTPATIENT)
Dept: OBGYN | Facility: CLINIC | Age: 45
End: 2025-01-09

## 2025-01-10 ENCOUNTER — RESULT REVIEW (OUTPATIENT)
Age: 45
End: 2025-01-10

## 2025-01-10 ENCOUNTER — OUTPATIENT (OUTPATIENT)
Dept: OUTPATIENT SERVICES | Facility: HOSPITAL | Age: 45
LOS: 1 days | End: 2025-01-10
Payer: COMMERCIAL

## 2025-01-10 ENCOUNTER — TRANSCRIPTION ENCOUNTER (OUTPATIENT)
Age: 45
End: 2025-01-10

## 2025-01-10 ENCOUNTER — APPOINTMENT (OUTPATIENT)
Dept: OBGYN | Facility: HOSPITAL | Age: 45
End: 2025-01-10

## 2025-01-10 VITALS
WEIGHT: 204.15 LBS | SYSTOLIC BLOOD PRESSURE: 135 MMHG | DIASTOLIC BLOOD PRESSURE: 81 MMHG | HEART RATE: 70 BPM | OXYGEN SATURATION: 98 % | HEIGHT: 63 IN | TEMPERATURE: 98 F | RESPIRATION RATE: 14 BRPM

## 2025-01-10 VITALS
DIASTOLIC BLOOD PRESSURE: 89 MMHG | OXYGEN SATURATION: 96 % | RESPIRATION RATE: 16 BRPM | HEART RATE: 86 BPM | SYSTOLIC BLOOD PRESSURE: 134 MMHG | TEMPERATURE: 99 F

## 2025-01-10 DIAGNOSIS — N93.9 ABNORMAL UTERINE AND VAGINAL BLEEDING, UNSPECIFIED: ICD-10-CM

## 2025-01-10 DIAGNOSIS — N83.209 UNSPECIFIED OVARIAN CYST, UNSPECIFIED SIDE: ICD-10-CM

## 2025-01-10 DIAGNOSIS — Z98.51 TUBAL LIGATION STATUS: Chronic | ICD-10-CM

## 2025-01-10 DIAGNOSIS — Z98.890 OTHER SPECIFIED POSTPROCEDURAL STATES: Chronic | ICD-10-CM

## 2025-01-10 LAB — GLUCOSE BLDC GLUCOMTR-MCNC: 129 MG/DL — HIGH (ref 70–99)

## 2025-01-10 PROCEDURE — C1889: CPT

## 2025-01-10 PROCEDURE — 82962 GLUCOSE BLOOD TEST: CPT

## 2025-01-10 PROCEDURE — S2900 ROBOTIC SURGICAL SYSTEM: CPT | Mod: NC

## 2025-01-10 PROCEDURE — 88307 TISSUE EXAM BY PATHOLOGIST: CPT

## 2025-01-10 PROCEDURE — C9399: CPT

## 2025-01-10 PROCEDURE — 58571 TLH W/T/O 250 G OR LESS: CPT

## 2025-01-10 PROCEDURE — 88307 TISSUE EXAM BY PATHOLOGIST: CPT | Mod: 26

## 2025-01-10 PROCEDURE — S2900: CPT

## 2025-01-10 DEVICE — VISTASEAL FIBRIN HUMAN 10ML: Type: IMPLANTABLE DEVICE | Site: BILATERAL | Status: FUNCTIONAL

## 2025-01-10 RX ORDER — SODIUM CHLORIDE 9 MG/ML
1000 INJECTION, SOLUTION INTRAVENOUS
Refills: 0 | Status: DISCONTINUED | OUTPATIENT
Start: 2025-01-10 | End: 2025-01-10

## 2025-01-10 RX ORDER — CELECOXIB 200 MG
400 CAPSULE ORAL ONCE
Refills: 0 | Status: COMPLETED | OUTPATIENT
Start: 2025-01-10 | End: 2025-01-10

## 2025-01-10 RX ORDER — OXYCODONE HCL 15 MG
1 TABLET ORAL
Qty: 5 | Refills: 0
Start: 2025-01-10

## 2025-01-10 RX ORDER — GABAPENTIN 300 MG/1
600 CAPSULE ORAL ONCE
Refills: 0 | Status: COMPLETED | OUTPATIENT
Start: 2025-01-10 | End: 2025-01-10

## 2025-01-10 RX ORDER — KETOROLAC TROMETHAMINE 30 MG/ML
30 INJECTION INTRAMUSCULAR; INTRAVENOUS ONCE
Refills: 0 | Status: DISCONTINUED | OUTPATIENT
Start: 2025-01-10 | End: 2025-01-10

## 2025-01-10 RX ORDER — OXYCODONE HCL 15 MG
5 TABLET ORAL ONCE
Refills: 0 | Status: DISCONTINUED | OUTPATIENT
Start: 2025-01-10 | End: 2025-01-10

## 2025-01-10 RX ORDER — ACETAMINOPHEN 80 MG/.8ML
2 SOLUTION/ DROPS ORAL
Qty: 0 | Refills: 0 | DISCHARGE

## 2025-01-10 RX ORDER — HYDROMORPHONE HCL 4 MG
0.5 TABLET ORAL
Refills: 0 | Status: DISCONTINUED | OUTPATIENT
Start: 2025-01-10 | End: 2025-01-10

## 2025-01-10 RX ORDER — HEPARIN SODIUM 1000 [USP'U]/ML
5000 INJECTION, SOLUTION INTRAVENOUS; SUBCUTANEOUS ONCE
Refills: 0 | Status: COMPLETED | OUTPATIENT
Start: 2025-01-10 | End: 2025-01-10

## 2025-01-10 RX ORDER — ACETAMINOPHEN 80 MG/.8ML
975 SOLUTION/ DROPS ORAL ONCE
Refills: 0 | Status: ACTIVE | OUTPATIENT
Start: 2025-01-10 | End: 2025-12-09

## 2025-01-10 RX ORDER — IBUPROFEN 200 MG
3 TABLET ORAL
Qty: 0 | Refills: 0 | DISCHARGE

## 2025-01-10 RX ORDER — ONDANSETRON 4 MG/1
4 TABLET ORAL ONCE
Refills: 0 | Status: DISCONTINUED | OUTPATIENT
Start: 2025-01-10 | End: 2025-01-10

## 2025-01-10 RX ORDER — HYDROMORPHONE HCL 4 MG
1 TABLET ORAL
Refills: 0 | Status: DISCONTINUED | OUTPATIENT
Start: 2025-01-10 | End: 2025-01-10

## 2025-01-10 RX ORDER — ACETAMINOPHEN 80 MG/.8ML
975 SOLUTION/ DROPS ORAL ONCE
Refills: 0 | Status: COMPLETED | OUTPATIENT
Start: 2025-01-10 | End: 2025-01-10

## 2025-01-10 RX ADMIN — GABAPENTIN 600 MILLIGRAM(S): 300 CAPSULE ORAL at 07:17

## 2025-01-10 RX ADMIN — Medication 400 MILLIGRAM(S): at 07:16

## 2025-01-10 RX ADMIN — HEPARIN SODIUM 5000 UNIT(S): 1000 INJECTION, SOLUTION INTRAVENOUS; SUBCUTANEOUS at 07:18

## 2025-01-10 RX ADMIN — Medication 1 MILLIGRAM(S): at 11:30

## 2025-01-10 RX ADMIN — KETOROLAC TROMETHAMINE 30 MILLIGRAM(S): 30 INJECTION INTRAMUSCULAR; INTRAVENOUS at 12:00

## 2025-01-10 RX ADMIN — Medication 1 MILLIGRAM(S): at 10:57

## 2025-01-10 RX ADMIN — SODIUM CHLORIDE 50 MILLILITER(S): 9 INJECTION, SOLUTION INTRAVENOUS at 06:42

## 2025-01-10 RX ADMIN — ACETAMINOPHEN 975 MILLIGRAM(S): 80 SOLUTION/ DROPS ORAL at 07:16

## 2025-01-10 NOTE — ASU DISCHARGE PLAN (ADULT/PEDIATRIC) - CARE PROVIDER_API CALL
Robe Jordan  Obstetrics and Gynecology  [address] [place] [address]  Phone: [phone]  Fax: [phone]  Follow Up Time:

## 2025-01-10 NOTE — ASU PREOP CHECKLIST - SELECT TESTS ORDERED
TORRIE UCG negative 1/10/25, BS/UCG/POCT Blood Glucose UCG negative 1/10/25,  @633am/UCG/POCT Blood Glucose

## 2025-01-10 NOTE — ASU DISCHARGE PLAN (ADULT/PEDIATRIC) - ASU DC SPECIAL INSTRUCTIONSFT
May shower.  Allow soapy water run over abdomen and pat dry in 24 hours  May remove bandages in 24 hours.   Regular ambulation  Deep breathing./Incentive spirometer  Ice packs to abdomen as needed- 15 minutes on and 15 minutes off  Regular diet, advance as tolerated  Drink plenty of fluids (non caffeinated) while taking pain medications     No reaching, pulling, pushing, lifting  for 6 weeks    Pain medication is given immediately following your surgery to help with post-operative pain. Upon  your discharge home, we suggest scheduled doses of Acetaminophen (tylenol) every 6 hours and  Ibuprofen (Motrin) every 6 hours, alternating between medications every 3 hours (i.e. Take tylenol  and wait 3 hours, then take Motrin and wait 3 hours, repeat) for the first 3-4 days after surgery. If  you are without significant pain, medications can be taken more infrequently. It is important to  follow dosing instructions on the medication bottle or prescription.      ****Call the office with any problems including but not limited to heavy vaginal bleeding, fevers, severe abdominal pain, inability to eat/drink/urinate  **** Nothing in the vagina x8 weeks-( No sex, tampons, douching )  *****You may shower as usual but no hot tubs, bath tubs, swimming pools x6 weeks.

## 2025-01-10 NOTE — BRIEF OPERATIVE NOTE - NSICDXBRIEFPROCEDURE_GEN_ALL_CORE_FT
PROCEDURES:  Robot-assisted laparoscopic total hysterectomy with bilateral salpingectomy and cystoscopy using da Gregory Xi 10-Aroldo-2025 10:04:26  Robe Jordan

## 2025-01-10 NOTE — ASU DISCHARGE PLAN (ADULT/PEDIATRIC) - PATIENT EDUCATION MATERIALS PROVIED
abdominal binder, ice pack, incentive spirometry, pain medication/Pre-printed instructions given for other (specify)

## 2025-01-10 NOTE — ASU DISCHARGE PLAN (ADULT/PEDIATRIC) - FINANCIAL ASSISTANCE
[facility] provides services at a reduced cost to those who are determined to be eligible through [facility]’s financial assistance program. Information regarding [facility]’s financial assistance program can be found by going to [email or web address] or by calling [phone].

## 2025-01-17 LAB — SURGICAL PATHOLOGY STUDY: SIGNIFICANT CHANGE UP

## 2025-01-23 ENCOUNTER — APPOINTMENT (OUTPATIENT)
Dept: OBGYN | Facility: CLINIC | Age: 45
End: 2025-01-23
Payer: COMMERCIAL

## 2025-01-23 VITALS
HEIGHT: 64 IN | BODY MASS INDEX: 35.34 KG/M2 | SYSTOLIC BLOOD PRESSURE: 128 MMHG | WEIGHT: 207 LBS | DIASTOLIC BLOOD PRESSURE: 74 MMHG

## 2025-01-23 DIAGNOSIS — Z48.89 ENCOUNTER FOR OTHER SPECIFIED SURGICAL AFTERCARE: ICD-10-CM

## 2025-01-23 PROCEDURE — 99024 POSTOP FOLLOW-UP VISIT: CPT

## 2025-02-27 ENCOUNTER — APPOINTMENT (OUTPATIENT)
Dept: OBGYN | Facility: CLINIC | Age: 45
End: 2025-02-27
Payer: COMMERCIAL

## 2025-02-27 VITALS
BODY MASS INDEX: 35.68 KG/M2 | WEIGHT: 209 LBS | HEIGHT: 64 IN | DIASTOLIC BLOOD PRESSURE: 78 MMHG | SYSTOLIC BLOOD PRESSURE: 120 MMHG

## 2025-02-27 DIAGNOSIS — Z48.89 ENCOUNTER FOR OTHER SPECIFIED SURGICAL AFTERCARE: ICD-10-CM

## 2025-02-27 PROCEDURE — 99024 POSTOP FOLLOW-UP VISIT: CPT

## 2025-05-04 ENCOUNTER — INPATIENT (INPATIENT)
Facility: HOSPITAL | Age: 45
LOS: 0 days | Discharge: HOME | DRG: 282 | End: 2025-05-05
Attending: STUDENT IN AN ORGANIZED HEALTH CARE EDUCATION/TRAINING PROGRAM | Admitting: STUDENT IN AN ORGANIZED HEALTH CARE EDUCATION/TRAINING PROGRAM
Payer: COMMERCIAL

## 2025-05-04 VITALS
DIASTOLIC BLOOD PRESSURE: 90 MMHG | OXYGEN SATURATION: 98 % | SYSTOLIC BLOOD PRESSURE: 157 MMHG | WEIGHT: 205.03 LBS | TEMPERATURE: 98 F | RESPIRATION RATE: 18 BRPM | HEART RATE: 60 BPM | HEIGHT: 64 IN

## 2025-05-04 DIAGNOSIS — Z98.890 OTHER SPECIFIED POSTPROCEDURAL STATES: Chronic | ICD-10-CM

## 2025-05-04 DIAGNOSIS — R51.9 HEADACHE, UNSPECIFIED: ICD-10-CM

## 2025-05-04 DIAGNOSIS — Z98.51 TUBAL LIGATION STATUS: Chronic | ICD-10-CM

## 2025-05-04 LAB
ALBUMIN SERPL ELPH-MCNC: 3.9 G/DL — SIGNIFICANT CHANGE UP (ref 3.3–5)
ALP SERPL-CCNC: 135 U/L — HIGH (ref 30–120)
ALT FLD-CCNC: 19 U/L — SIGNIFICANT CHANGE UP (ref 10–60)
ANION GAP SERPL CALC-SCNC: 9 MMOL/L — SIGNIFICANT CHANGE UP (ref 5–17)
APTT BLD: 41.2 SEC — HIGH (ref 26.1–36.8)
AST SERPL-CCNC: 21 U/L — SIGNIFICANT CHANGE UP (ref 10–40)
BASOPHILS # BLD AUTO: 0.04 K/UL — SIGNIFICANT CHANGE UP (ref 0–0.2)
BASOPHILS NFR BLD AUTO: 0.7 % — SIGNIFICANT CHANGE UP (ref 0–2)
BILIRUB SERPL-MCNC: 0.4 MG/DL — SIGNIFICANT CHANGE UP (ref 0.2–1.2)
BUN SERPL-MCNC: 8 MG/DL — SIGNIFICANT CHANGE UP (ref 7–23)
CALCIUM SERPL-MCNC: 9.4 MG/DL — SIGNIFICANT CHANGE UP (ref 8.4–10.5)
CHLORIDE SERPL-SCNC: 102 MMOL/L — SIGNIFICANT CHANGE UP (ref 96–108)
CO2 SERPL-SCNC: 28 MMOL/L — SIGNIFICANT CHANGE UP (ref 22–31)
CREAT SERPL-MCNC: 0.78 MG/DL — SIGNIFICANT CHANGE UP (ref 0.5–1.3)
EGFR: 96 ML/MIN/1.73M2 — SIGNIFICANT CHANGE UP
EGFR: 96 ML/MIN/1.73M2 — SIGNIFICANT CHANGE UP
EOSINOPHIL # BLD AUTO: 0.17 K/UL — SIGNIFICANT CHANGE UP (ref 0–0.5)
EOSINOPHIL NFR BLD AUTO: 3 % — SIGNIFICANT CHANGE UP (ref 0–6)
GLUCOSE SERPL-MCNC: 128 MG/DL — HIGH (ref 70–99)
HCG SERPL-ACNC: <1 MIU/ML — SIGNIFICANT CHANGE UP
HCT VFR BLD CALC: 37 % — SIGNIFICANT CHANGE UP (ref 34.5–45)
HGB BLD-MCNC: 12.2 G/DL — SIGNIFICANT CHANGE UP (ref 11.5–15.5)
IMM GRANULOCYTES NFR BLD AUTO: 0.2 % — SIGNIFICANT CHANGE UP (ref 0–0.9)
INR BLD: 1.01 RATIO — SIGNIFICANT CHANGE UP (ref 0.85–1.16)
LYMPHOCYTES # BLD AUTO: 1.9 K/UL — SIGNIFICANT CHANGE UP (ref 1–3.3)
LYMPHOCYTES # BLD AUTO: 33.7 % — SIGNIFICANT CHANGE UP (ref 13–44)
MCHC RBC-ENTMCNC: 27.1 PG — SIGNIFICANT CHANGE UP (ref 27–34)
MCHC RBC-ENTMCNC: 33 G/DL — SIGNIFICANT CHANGE UP (ref 32–36)
MCV RBC AUTO: 82.2 FL — SIGNIFICANT CHANGE UP (ref 80–100)
MONOCYTES # BLD AUTO: 0.57 K/UL — SIGNIFICANT CHANGE UP (ref 0–0.9)
MONOCYTES NFR BLD AUTO: 10.1 % — SIGNIFICANT CHANGE UP (ref 2–14)
NEUTROPHILS # BLD AUTO: 2.95 K/UL — SIGNIFICANT CHANGE UP (ref 1.8–7.4)
NEUTROPHILS NFR BLD AUTO: 52.3 % — SIGNIFICANT CHANGE UP (ref 43–77)
NRBC BLD AUTO-RTO: 0 /100 WBCS — SIGNIFICANT CHANGE UP (ref 0–0)
PLATELET # BLD AUTO: 201 K/UL — SIGNIFICANT CHANGE UP (ref 150–400)
POTASSIUM SERPL-MCNC: 3.9 MMOL/L — SIGNIFICANT CHANGE UP (ref 3.5–5.3)
POTASSIUM SERPL-SCNC: 3.9 MMOL/L — SIGNIFICANT CHANGE UP (ref 3.5–5.3)
PROT SERPL-MCNC: 7.4 G/DL — SIGNIFICANT CHANGE UP (ref 6–8.3)
PROTHROM AB SERPL-ACNC: 11.7 SEC — SIGNIFICANT CHANGE UP (ref 9.9–13.4)
RBC # BLD: 4.5 M/UL — SIGNIFICANT CHANGE UP (ref 3.8–5.2)
RBC # FLD: 16.1 % — HIGH (ref 10.3–14.5)
SODIUM SERPL-SCNC: 139 MMOL/L — SIGNIFICANT CHANGE UP (ref 135–145)
TROPONIN I, HIGH SENSITIVITY RESULT: 175.5 NG/L — HIGH
TROPONIN I, HIGH SENSITIVITY RESULT: 199.1 NG/L — HIGH
WBC # BLD: 5.64 K/UL — SIGNIFICANT CHANGE UP (ref 3.8–10.5)
WBC # FLD AUTO: 5.64 K/UL — SIGNIFICANT CHANGE UP (ref 3.8–10.5)

## 2025-05-04 PROCEDURE — 70498 CT ANGIOGRAPHY NECK: CPT | Mod: 26

## 2025-05-04 PROCEDURE — 99222 1ST HOSP IP/OBS MODERATE 55: CPT

## 2025-05-04 PROCEDURE — 70450 CT HEAD/BRAIN W/O DYE: CPT | Mod: 26,59

## 2025-05-04 PROCEDURE — 93356 MYOCRD STRAIN IMG SPCKL TRCK: CPT

## 2025-05-04 PROCEDURE — 93306 TTE W/DOPPLER COMPLETE: CPT | Mod: 26

## 2025-05-04 PROCEDURE — 70496 CT ANGIOGRAPHY HEAD: CPT | Mod: 26

## 2025-05-04 PROCEDURE — 76376 3D RENDER W/INTRP POSTPROCES: CPT | Mod: 26

## 2025-05-04 PROCEDURE — 93010 ELECTROCARDIOGRAM REPORT: CPT

## 2025-05-04 PROCEDURE — 99291 CRITICAL CARE FIRST HOUR: CPT

## 2025-05-04 RX ORDER — ATORVASTATIN CALCIUM 80 MG/1
80 TABLET, FILM COATED ORAL AT BEDTIME
Refills: 0 | Status: DISCONTINUED | OUTPATIENT
Start: 2025-05-04 | End: 2025-05-05

## 2025-05-04 RX ORDER — ASPIRIN 325 MG
81 TABLET ORAL DAILY
Refills: 0 | Status: DISCONTINUED | OUTPATIENT
Start: 2025-05-05 | End: 2025-05-05

## 2025-05-04 RX ORDER — METOCLOPRAMIDE HCL 10 MG
10 TABLET ORAL ONCE
Refills: 0 | Status: COMPLETED | OUTPATIENT
Start: 2025-05-04 | End: 2025-05-04

## 2025-05-04 RX ORDER — CLOPIDOGREL BISULFATE 75 MG/1
300 TABLET, FILM COATED ORAL ONCE
Refills: 0 | Status: COMPLETED | OUTPATIENT
Start: 2025-05-04 | End: 2025-05-04

## 2025-05-04 RX ORDER — ENOXAPARIN SODIUM 100 MG/ML
40 INJECTION SUBCUTANEOUS EVERY 24 HOURS
Refills: 0 | Status: DISCONTINUED | OUTPATIENT
Start: 2025-05-04 | End: 2025-05-05

## 2025-05-04 RX ORDER — ASPIRIN 325 MG
324 TABLET ORAL ONCE
Refills: 0 | Status: COMPLETED | OUTPATIENT
Start: 2025-05-04 | End: 2025-05-04

## 2025-05-04 RX ORDER — LEVOTHYROXINE SODIUM 300 MCG
88 TABLET ORAL DAILY
Refills: 0 | Status: DISCONTINUED | OUTPATIENT
Start: 2025-05-04 | End: 2025-05-05

## 2025-05-04 RX ORDER — KETOROLAC TROMETHAMINE 30 MG/ML
30 INJECTION, SOLUTION INTRAMUSCULAR; INTRAVENOUS ONCE
Refills: 0 | Status: DISCONTINUED | OUTPATIENT
Start: 2025-05-04 | End: 2025-05-04

## 2025-05-04 RX ORDER — SODIUM CHLORIDE 9 G/1000ML
1000 INJECTION, SOLUTION INTRAVENOUS
Refills: 0 | Status: DISCONTINUED | OUTPATIENT
Start: 2025-05-04 | End: 2025-05-05

## 2025-05-04 RX ORDER — CLOPIDOGREL BISULFATE 75 MG/1
75 TABLET, FILM COATED ORAL DAILY
Refills: 0 | Status: DISCONTINUED | OUTPATIENT
Start: 2025-05-05 | End: 2025-05-05

## 2025-05-04 RX ORDER — MECLIZINE HCL 12.5 MG
25 TABLET ORAL ONCE
Refills: 0 | Status: COMPLETED | OUTPATIENT
Start: 2025-05-04 | End: 2025-05-04

## 2025-05-04 RX ADMIN — Medication 324 MILLIGRAM(S): at 16:36

## 2025-05-04 RX ADMIN — Medication 1000 MILLILITER(S): at 17:06

## 2025-05-04 RX ADMIN — KETOROLAC TROMETHAMINE 30 MILLIGRAM(S): 30 INJECTION, SOLUTION INTRAMUSCULAR; INTRAVENOUS at 16:30

## 2025-05-04 RX ADMIN — ATORVASTATIN CALCIUM 80 MILLIGRAM(S): 80 TABLET, FILM COATED ORAL at 21:48

## 2025-05-04 RX ADMIN — Medication 25 MILLIGRAM(S): at 15:24

## 2025-05-04 RX ADMIN — Medication 1000 MILLILITER(S): at 15:47

## 2025-05-04 RX ADMIN — ENOXAPARIN SODIUM 40 MILLIGRAM(S): 100 INJECTION SUBCUTANEOUS at 21:48

## 2025-05-04 RX ADMIN — KETOROLAC TROMETHAMINE 30 MILLIGRAM(S): 30 INJECTION, SOLUTION INTRAMUSCULAR; INTRAVENOUS at 16:11

## 2025-05-04 RX ADMIN — SODIUM CHLORIDE 75 MILLILITER(S): 9 INJECTION, SOLUTION INTRAVENOUS at 18:50

## 2025-05-04 RX ADMIN — SODIUM CHLORIDE 75 MILLILITER(S): 9 INJECTION, SOLUTION INTRAVENOUS at 17:46

## 2025-05-04 RX ADMIN — CLOPIDOGREL BISULFATE 300 MILLIGRAM(S): 75 TABLET, FILM COATED ORAL at 17:45

## 2025-05-04 RX ADMIN — Medication 10 MILLIGRAM(S): at 15:23

## 2025-05-04 NOTE — ED PROVIDER NOTE - CLINICAL SUMMARY MEDICAL DECISION MAKING FREE TEXT BOX
normal/clear to auscultation bilaterally/no wheezes/no rales/no rhonchi
Patient complaining of dizziness headache nausea and blurred vision since she woke up at 7 AM.  Patient relates she was in her normal state of health when she went to bed at approximately 3 AM when she woke up had mild symptoms but they worsened over the past few hours.  Patient relates dizziness slight unsteady feeling.  Patient denies fevers chills weakness numbness vomiting chest pain shortness of breath cough abdominal pain rash neck stiffness photophobia.  PMD forgot name    Plan stroke workup including CT head CTA head neck EKG labs IV fluids Reglan Antivert Toradol neurology consult    Differential including but not limited to ICH CVA TIA electrolyte abnormality dehydration

## 2025-05-04 NOTE — H&P ADULT - HISTORY OF PRESENT ILLNESS
45 y/o female with PMH of hypothyroidism, hyperlipidemia, MVP and anemia presented to ED with the complain of dizziness, headache, nausea and blurred vision since she woke up at 7 AM.  Patient relates she was in her normal state of health when she went to bed at approximately 3 AM when she woke up had mild symptoms but they worsened over the past few hours.  Patient relates dizziness with slight unsteady feeling.  Patient denies fevers, chills, weakness, numbness, vomiting, chest pain, shortness of breath, cough, abdominal pain, rash, neck stiffness, photophobia. Stroke code was called in the ED.   CT head, CTA head and neck negative for stroke, Tele stroke and neurology were consulted, recommended admission.   43 y/o female with PMH of hypothyroidism, hyperlipidemia, MVP and anemia presented to ED with the complain of dizziness, headache, nausea and blurred vision since she woke up at 7 AM.  Patient relates she was in her normal state of health when she went to bed at approximately 3 AM when she woke up had mild symptoms but they worsened over the past few hours.  Patient relates dizziness with slight unsteady feeling.  Patient denies fevers, chills, weakness, numbness, vomiting, chest pain, shortness of breath, cough, abdominal pain, rash, neck stiffness, photophobia. Stroke code was called in the ED.   CT head, CTA head and neck negative for stroke, Tele stroke and neurology were consulted, recommended admission.    During my evaluation she reported her symptoms has resolved. Her daughter at bedside, she stated pt never had high blood pressure and she does not take any other medications except Levothyroxine.

## 2025-05-04 NOTE — H&P ADULT - NSHPREVIEWOFSYSTEMS_GEN_ALL_CORE
REVIEW OF SYSTEMS:  CONSTITUTIONAL: No fever, weight loss, or fatigue  EYEs: Admits blurry vision   ENMT:  No difficulty hearing, tinnitus, vertigo; No sinus or throat pain  RESPIRATORY: No cough, wheezing, chills or hemoptysis; No shortness of breath  CARDIOVASCULAR: No chest pain, palpitations, dizziness, or leg swelling  GASTROINTESTINAL:Admits nausea, No constipation,  No abdominal or epigastric pain. No, vomiting, or hematemesis; No diarrhea   GENITOURINARY: No dysuria, frequency, hematuria, or incontinence  NEUROLOGICAL: Admits headaches, dizziness,  denies memory loss, loss of strength, numbness, or tremors  SKIN: No itching, burning, rashes, or lesions   MUSCULOSKELETAL: No pain or swelling or redness REVIEW OF SYSTEMS:  CONSTITUTIONAL: No fever, weight loss, or fatigue  EYEs: Admits blurry vision   ENMT:  No difficulty hearing, tinnitus, vertigo; No sinus or throat pain  RESPIRATORY: No cough, wheezing, chills or hemoptysis; No shortness of breath  CARDIOVASCULAR: No chest pain, palpitations, dizziness, or leg swelling  GASTROINTESTINAL: Admits nausea, No constipation,  No abdominal or epigastric pain. No, vomiting, or hematemesis; No diarrhea   GENITOURINARY: No dysuria, frequency, hematuria, or incontinence  NEUROLOGICAL: Admits headaches, dizziness,  denies memory loss, loss of strength, numbness, or tremors  SKIN: No itching, burning, rashes, or lesions   MUSCULOSKELETAL: No pain or swelling or redness

## 2025-05-04 NOTE — H&P ADULT - NSHPLABSRESULTS_GEN_ALL_CORE
Labs:                          12.2   5.64  )-----------( 201      ( 04 May 2025 15:03 )             37.0       05-04    139  |  102  |  8   ----------------------------<  128[H]  3.9   |  28  |  0.78    Ca    9.4      04 May 2025 15:03    TPro  7.4  /  Alb  3.9  /  TBili  0.4  /  DBili  x   /  AST  21  /  ALT  19  /  AlkPhos  135[H]  05-04              Urinalysis Basic - ( 04 May 2025 15:03 )    Color: x / Appearance: x / SG: x / pH: x  Gluc: 128 mg/dL / Ketone: x  / Bili: x / Urobili: x   Blood: x / Protein: x / Nitrite: x   Leuk Esterase: x / RBC: x / WBC x   Sq Epi: x / Non Sq Epi: x / Bacteria: x        PT/INR - ( 04 May 2025 15:03 )   PT: 11.7 sec;   INR: 1.01 ratio         PTT - ( 04 May 2025 15:03 )  PTT:41.2 sec    Lactate Trend            CAPILLARY BLOOD GLUCOSE      POCT Blood Glucose.: 134 mg/dL (04 May 2025 15:05)        EKG:   Personally Reviewed:  [ ] YES     Imaging:   Personally Reviewed:  [ x ] YES

## 2025-05-04 NOTE — H&P ADULT - ASSESSMENT
Dizziness, blurry vision, acute stroke suspected   CT head, CTA head and neck negative for stroke,   Tele stroke and neurology were consulted, recommended admission  S/p aspirin 324 mg, meclizine 25 mg, Reglan 10  in the ED  Start plavix 300 mg once then 75 mg daily   Start aspirin 81 mg daily   Start Lipitor 80 mg daily   LR at 75 ml/hr   Follow up A1c, TSH, lipid panel,   Follow up ECHO   Pt and OT   Bedside swallow eval   Reglan prn for nausea, vomiting   Meclizine prn for dizziness   Follow up neuro recs     Elevated troponin likely NSTEMI type II    EKG***  Trend troponin   Follow up ECHO  Cardio consulted in the ED    Hypothyroidism  Hyperlipidemia      DVT prophylaxis  Lovenox       Dizziness, blurry vision, high blood pressure, acute stroke suspected   CT head, CTA head and neck negative for stroke,   Tele stroke and neurology were consulted, recommended admission  S/p aspirin 324 mg, meclizine 25 mg, Reglan 10  in the ED  Start plavix 300 mg once then 75 mg daily   Start aspirin 81 mg daily   Start Lipitor 80 mg daily   LR at 75 ml/hr   Follow up A1c, TSH, lipid panel,   Follow up ECHO   Pt and OT   Bedside swallow eval   Reglan prn for nausea, vomiting   Meclizine prn for dizziness   Follow up neuro recs   Allow permissive hypertension     Elevated troponin likely NSTEMI type II   EKG showed sinus rhythm with non specific T wave abnormalities   Trend troponin   Follow up ECHO  Cardio consulted in the ED    Hypothyroidism  On levothyroxine 88 mcg daily       DVT prophylaxis  Lovenox

## 2025-05-04 NOTE — PATIENT PROFILE ADULT - FALL HARM RISK - HARM RISK INTERVENTIONS

## 2025-05-04 NOTE — ED PROVIDER NOTE - DIFFERENTIAL DIAGNOSIS
Differential Diagnosis Differential including but not limited to ICH CVA TIA electrolyte abnormality dehydration

## 2025-05-04 NOTE — DISCHARGE NOTE NURSING/CASE MANAGEMENT/SOCIAL WORK - FINANCIAL ASSISTANCE
[facility] provides services at a reduced cost to those who are determined to be eligible through [facility]’s financial assistance program. Information regarding [facility]’s financial assistance program can be found by going to [email or web address] or by calling [phone].

## 2025-05-04 NOTE — ED ADULT NURSE NOTE - DISTAL EXTREMITY COLOR
[facility]  [address] [place] [address]  [phone]        Astrid Santoro                                                               Date: 9/27/2017  [id number] [facility] [address]      Dear Astrid,    In order to ensure we are providing the best quality care, we have reviewed your chart and see that you are due for:  1.   Pap and Physical   2.   Colonoscopy  3.   Mammogram please schedule: [place]=[phone], [place]=[phone], [place]=[phone], [facility] [phone] or [place]qft=[phone]    Please call the clinic at your earliest convenience to schedule a Physical Exam  appointment with Tiffanie Sherwood RNC,NP.    We greatly appreciate the opportunity to serve you.  Thank you for trusting us with your health care.      Sincerely,    Your care team at [facility]/    
color consistent with ethnicity/race

## 2025-05-04 NOTE — ED PROVIDER NOTE - CARE PLAN
1 Principal Discharge DX:	Headache  Secondary Diagnosis:	Dizziness  Secondary Diagnosis:	Blurred vision   Principal Discharge DX:	Headache  Secondary Diagnosis:	Dizziness  Secondary Diagnosis:	Blurred vision  Secondary Diagnosis:	Elevated troponin

## 2025-05-04 NOTE — ED ADULT NURSE NOTE - OBJECTIVE STATEMENT
pt comes to ed c/o dizziness, H/A, nausea and blurred vision since she woke up at 7 AM.  Pt states she was on her phone when she fell asleep around 3am..  no focal deficits noted. Patient denies fevers chills weakness numbness vomiting chest pain shortness of breath cough abdominal pain rash neck stiffness photophobia.

## 2025-05-04 NOTE — DISCHARGE NOTE NURSING/CASE MANAGEMENT/SOCIAL WORK - NSDCVIVACCINE_GEN_ALL_CORE_FT
Tdap; 26-May-2018 09:31; Chela Ramirez (DEDRICK); [employer]; [id number]; IntraMuscular; Deltoid Left.; 0.5 milliLiter(s); VIS (VIS Published: 09-May-2013, VIS Presented: 26-May-2018);

## 2025-05-04 NOTE — ED PROVIDER NOTE - OBJECTIVE STATEMENT
Patient complaining of dizziness headache nausea and blurred vision since she woke up at 7 AM.  Patient relates she was in her normal state of health when she went to bed at approximately 3 AM when she woke up had mild symptoms but they worsened over the past few hours.  Patient relates dizziness slight unsteady feeling.  Patient denies fevers chills weakness numbness vomiting chest pain shortness of breath cough abdominal pain rash neck stiffness photophobia.  PMD forgot name

## 2025-05-04 NOTE — DISCHARGE NOTE NURSING/CASE MANAGEMENT/SOCIAL WORK - PATIENT PORTAL LINK FT
You can access the FollowMyHealth Patient Portal offered by [facility] by registering at the following website: [email or web address]. By joining [facility]’s FollowMyHealth portal, you will also be able to view your health information using other applications (apps) compatible with our system.

## 2025-05-04 NOTE — ED PROVIDER NOTE - PROGRESS NOTE DETAILS
D/W Dr. Lara (telestroke attending) requests admit for stroke workup requests aspirin called telestroke Discussed with Dr. Wilson (attending cardiologist) he reviewed EKG will follow patient Discussed with Dr. Aguila (attending neurologist) he will follow patient d/w Dr Contreras (attending hospitalist) she will admit pt

## 2025-05-04 NOTE — ED ADULT TRIAGE NOTE - CHIEF COMPLAINT QUOTE
" I woke up this morning at 7 am with headache  - back of my head , blurring of vision  and dizziness " No slurred speech No arm drift No facial drooping noted

## 2025-05-05 ENCOUNTER — TRANSCRIPTION ENCOUNTER (OUTPATIENT)
Age: 45
End: 2025-05-05

## 2025-05-05 VITALS
HEART RATE: 64 BPM | DIASTOLIC BLOOD PRESSURE: 74 MMHG | TEMPERATURE: 98 F | SYSTOLIC BLOOD PRESSURE: 110 MMHG | RESPIRATION RATE: 17 BRPM | OXYGEN SATURATION: 98 %

## 2025-05-05 LAB
A1C WITH ESTIMATED AVERAGE GLUCOSE RESULT: 6 % — HIGH (ref 4–5.6)
ANION GAP SERPL CALC-SCNC: 10 MMOL/L — SIGNIFICANT CHANGE UP (ref 5–17)
BUN SERPL-MCNC: 10 MG/DL — SIGNIFICANT CHANGE UP (ref 7–23)
CALCIUM SERPL-MCNC: 8.9 MG/DL — SIGNIFICANT CHANGE UP (ref 8.4–10.5)
CHLORIDE SERPL-SCNC: 105 MMOL/L — SIGNIFICANT CHANGE UP (ref 96–108)
CHOLEST SERPL-MCNC: 201 MG/DL — HIGH
CO2 SERPL-SCNC: 27 MMOL/L — SIGNIFICANT CHANGE UP (ref 22–31)
CREAT SERPL-MCNC: 0.74 MG/DL — SIGNIFICANT CHANGE UP (ref 0.5–1.3)
EGFR: 102 ML/MIN/1.73M2 — SIGNIFICANT CHANGE UP
EGFR: 102 ML/MIN/1.73M2 — SIGNIFICANT CHANGE UP
ESTIMATED AVERAGE GLUCOSE: 126 MG/DL — HIGH (ref 68–114)
GLUCOSE SERPL-MCNC: 130 MG/DL — HIGH (ref 70–99)
HDLC SERPL-MCNC: 35 MG/DL — LOW
LDLC SERPL-MCNC: 113 MG/DL — HIGH
LIPID PNL WITH DIRECT LDL SERPL: 113 MG/DL — HIGH
NONHDLC SERPL-MCNC: 165 MG/DL — HIGH
POTASSIUM SERPL-MCNC: 3.8 MMOL/L — SIGNIFICANT CHANGE UP (ref 3.5–5.3)
POTASSIUM SERPL-SCNC: 3.8 MMOL/L — SIGNIFICANT CHANGE UP (ref 3.5–5.3)
SODIUM SERPL-SCNC: 142 MMOL/L — SIGNIFICANT CHANGE UP (ref 135–145)
TRIGL SERPL-MCNC: 300 MG/DL — HIGH
TSH SERPL-MCNC: 4.35 UIU/ML — HIGH (ref 0.27–4.2)

## 2025-05-05 PROCEDURE — 80048 BASIC METABOLIC PNL TOTAL CA: CPT

## 2025-05-05 PROCEDURE — 70450 CT HEAD/BRAIN W/O DYE: CPT | Mod: MC

## 2025-05-05 PROCEDURE — 93005 ELECTROCARDIOGRAM TRACING: CPT

## 2025-05-05 PROCEDURE — 80061 LIPID PANEL: CPT

## 2025-05-05 PROCEDURE — 93306 TTE W/DOPPLER COMPLETE: CPT

## 2025-05-05 PROCEDURE — 99285 EMERGENCY DEPT VISIT HI MDM: CPT

## 2025-05-05 PROCEDURE — 83036 HEMOGLOBIN GLYCOSYLATED A1C: CPT

## 2025-05-05 PROCEDURE — 93356 MYOCRD STRAIN IMG SPCKL TRCK: CPT

## 2025-05-05 PROCEDURE — 84484 ASSAY OF TROPONIN QUANT: CPT

## 2025-05-05 PROCEDURE — 80053 COMPREHEN METABOLIC PANEL: CPT

## 2025-05-05 PROCEDURE — 36415 COLL VENOUS BLD VENIPUNCTURE: CPT

## 2025-05-05 PROCEDURE — 82962 GLUCOSE BLOOD TEST: CPT

## 2025-05-05 PROCEDURE — 70496 CT ANGIOGRAPHY HEAD: CPT | Mod: MC

## 2025-05-05 PROCEDURE — 85610 PROTHROMBIN TIME: CPT

## 2025-05-05 PROCEDURE — 84443 ASSAY THYROID STIM HORMONE: CPT

## 2025-05-05 PROCEDURE — 85730 THROMBOPLASTIN TIME PARTIAL: CPT

## 2025-05-05 PROCEDURE — 97161 PT EVAL LOW COMPLEX 20 MIN: CPT

## 2025-05-05 PROCEDURE — 97165 OT EVAL LOW COMPLEX 30 MIN: CPT

## 2025-05-05 PROCEDURE — 76376 3D RENDER W/INTRP POSTPROCES: CPT

## 2025-05-05 PROCEDURE — 99232 SBSQ HOSP IP/OBS MODERATE 35: CPT

## 2025-05-05 PROCEDURE — 85025 COMPLETE CBC W/AUTO DIFF WBC: CPT

## 2025-05-05 PROCEDURE — 84702 CHORIONIC GONADOTROPIN TEST: CPT

## 2025-05-05 PROCEDURE — 70498 CT ANGIOGRAPHY NECK: CPT | Mod: MC

## 2025-05-05 RX ORDER — CLOTRIMAZOLE 1 G/100G
1 CREAM TOPICAL
Refills: 0 | Status: DISCONTINUED | OUTPATIENT
Start: 2025-05-05 | End: 2025-05-05

## 2025-05-05 RX ORDER — FLUCONAZOLE 150 MG
150 TABLET ORAL ONCE
Refills: 0 | Status: COMPLETED | OUTPATIENT
Start: 2025-05-05 | End: 2025-05-05

## 2025-05-05 RX ORDER — AMLODIPINE BESYLATE 10 MG/1
1 TABLET ORAL
Qty: 30 | Refills: 0
Start: 2025-05-05 | End: 2025-06-03

## 2025-05-05 RX ORDER — CLOTRIMAZOLE 1 G/100G
1 CREAM TOPICAL DAILY
Refills: 0 | Status: DISCONTINUED | OUTPATIENT
Start: 2025-05-05 | End: 2025-05-05

## 2025-05-05 RX ORDER — ASPIRIN 325 MG
1 TABLET ORAL
Qty: 30 | Refills: 0
Start: 2025-05-05 | End: 2025-06-03

## 2025-05-05 RX ORDER — ATORVASTATIN CALCIUM 80 MG/1
1 TABLET, FILM COATED ORAL
Qty: 30 | Refills: 0
Start: 2025-05-05 | End: 2025-06-03

## 2025-05-05 RX ORDER — CLOTRIMAZOLE 1 G/100G
1 CREAM TOPICAL
Qty: 2 | Refills: 0
Start: 2025-05-05 | End: 2025-05-06

## 2025-05-05 RX ADMIN — CLOPIDOGREL BISULFATE 75 MILLIGRAM(S): 75 TABLET, FILM COATED ORAL at 11:41

## 2025-05-05 RX ADMIN — Medication 150 MILLIGRAM(S): at 11:40

## 2025-05-05 RX ADMIN — SODIUM CHLORIDE 75 MILLILITER(S): 9 INJECTION, SOLUTION INTRAVENOUS at 05:56

## 2025-05-05 RX ADMIN — Medication 88 MICROGRAM(S): at 05:56

## 2025-05-05 RX ADMIN — Medication 81 MILLIGRAM(S): at 11:41

## 2025-05-05 RX ADMIN — CLOTRIMAZOLE 1 APPLICATORFUL: 1 CREAM TOPICAL at 11:41

## 2025-05-05 NOTE — CARE COORDINATION ASSESSMENT. - NSCAREPROVIDERS_GEN_ALL_CORE_FT
CARE PROVIDERS:  Accepting Physician: Emily Contreras  Administration: Nadira Khan  Admitting: Emily Contreras  Attending: Emily Contreras  Consultant: Stephon Aguila  Consultant: August Wilson  Consultant: Kourtney Lara ED Attending: Gabino Walters ED Nurse: Emery Enriquez  Infection Control: Jennifer Allen  Nurse: Elizabeth Doyle  Nurse: Bambi Baker  Occupational Therapy: Floyd Bernal  PCA/Nursing Assistant: Kim Sims  Physical Therapy: Krista Valencia  Primary Team: Emily Contreras  Registered Dietitian: Willow Jackson  : Marycarmen Summers  Team: [facility], Team  UR// Supp. Assoc.: Myranda Israel

## 2025-05-05 NOTE — PROGRESS NOTE ADULT - ASSESSMENT
Dizziness, blurry vision, high blood pressure, acute stroke suspected   CT head, CTA head and neck negative for stroke,   Tele stroke and neurology were consulted, recommended admission  S/p aspirin 324 mg, meclizine 25 mg, Reglan 10  in the ED  S/p Plavix 300 mg   Continue with Plavix 75 mg daily   Continue aspirin 81 mg daily   Continue Lipitor 80 mg daily   D/c LR at 75 ml/hr   Follow up TSH, lipid panel  A1c 6   Follow up ECHO   Pt and OT   Reglan prn for nausea, vomiting   Meclizine prn for dizziness   Follow up neuro recs, possible MRI head needed   Allow permissive hypertension     Elevated troponin likely NSTEMI type II due to hypertensive emergency   EKG showed sinus rhythm with non specific T wave abnormalities   Troponin trended down   Follow up ECHO  Cardio consulted in the ED  Blood pressure improved without antihypertensive medications     Hypothyroidism  On levothyroxine 88 mcg daily       DVT prophylaxis  Lovenox   Dizziness, blurry vision, high blood pressure, acute stroke suspected   CT head, CTA head and neck negative for stroke,   Tele stroke and neurology were consulted, recommended admission  S/p aspirin 324 mg, meclizine 25 mg, Reglan 10  in the ED  S/p Plavix 300 mg   Continue with Plavix 75 mg daily   Continue aspirin 81 mg daily   Continue Lipitor 80 mg daily   D/c LR at 75 ml/hr   Follow up TSH, lipid panel  A1c 6   Follow up ECHO   Pt and OT   Reglan prn for nausea, vomiting   Meclizine prn for dizziness   Follow up neuro recs, possible MRI head needed   Allow permissive hypertension     Elevated troponin likely NSTEMI type II due to hypertensive emergency   EKG showed sinus rhythm with non specific T wave abnormalities   Troponin trended down   Follow up ECHO  Cardio consulted in the ED  Blood pressure improved without antihypertensive medications     Vaginal candidiasis   Start Fluconazole 150  mg once   Start clotrimazole cream daily     Hypothyroidism  On levothyroxine 88 mcg daily       DVT prophylaxis Lovenox     Total time spent 35 minutes   Lovenox

## 2025-05-05 NOTE — OCCUPATIONAL THERAPY INITIAL EVALUATION ADULT - PERTINENT HX OF CURRENT PROBLEM, REHAB EVAL
r/o CVA  presented to ED on 5/4/25 with the complain of dizziness, headache, nausea and blurred vision since she woke up at 7 AM on 5/4.  Patient relates she was in her normal state of health when she went to bed at approximately 3 AM when she woke up had mild symptoms but they worsened over the past few hours.  Patient relates dizziness with slight unsteady feeling.  Patient denies fevers, chills, weakness, numbness, vomiting, chest pain, shortness of breath, cough, abdominal pain, rash, neck stiffness, photophobia. Stroke code was called in the ED.   CT head, CTA head and neck negative for stroke, Tele stroke and neurology were consulted, recommended admission.

## 2025-05-05 NOTE — DISCHARGE NOTE PROVIDER - NSDCCPCAREPLAN_GEN_ALL_CORE_FT
PRINCIPAL DISCHARGE DIAGNOSIS  Diagnosis: Hypertensive emergency  Assessment and Plan of Treatment:       SECONDARY DISCHARGE DIAGNOSES  Diagnosis: Dizziness  Assessment and Plan of Treatment:     Diagnosis: Blurred vision  Assessment and Plan of Treatment:     Diagnosis: Elevated troponin  Assessment and Plan of Treatment:

## 2025-05-05 NOTE — DISCHARGE NOTE PROVIDER - DISCHARGE DIET
From: Mer Randall  To: Lauren Hussein  Sent: 2/27/2024 2:22 PM CST  Subject: Emgality refill    I need a refill on Emgality for March, this month is the end of my current application year for their patient assistance program & my new assistance program year begins on March 31. So in order to get the one refill I need for March, I need you to call in a prescription to their pharmacy for me. It’s kind of confusing, but that’s what they said I need to do, or I will have to skip my March Emgality shot. The number I call for their pharmacy is [phone]. Don’t know if you have that number. Please contact me if any questions.  
Lauren MARIE- okay to call in a 1 month script for pt per message below? Please advise, thank you.  
Ok to fill please let her know when it is done.  
Regular Diet - No restrictions

## 2025-05-05 NOTE — PROGRESS NOTE ADULT - SUBJECTIVE AND OBJECTIVE BOX
Patient is a 44y old  Female who presents with a chief complaint of Dizziness (04 May 2025 16:35)      INTERVAL HPI/OVERNIGHT EVENTS:  Overnight nothing significant happened.   Pt was seen today at bedside, complaints of itching and whitish vaginal discharge . Denies fever, chills, abdominal pain, cough , sob, chest pain.     MEDICATIONS  (STANDING):  aspirin enteric coated 81 milliGRAM(s) Oral daily  atorvastatin 80 milliGRAM(s) Oral at bedtime  clopidogrel Tablet 75 milliGRAM(s) Oral daily  clotrimazole 2% Vaginal Cream 1 Applicatorful Vaginal daily  enoxaparin Injectable 40 milliGRAM(s) SubCutaneous every 24 hours  fluconAZOLE   Tablet 150 milliGRAM(s) Oral once  levothyroxine 88 MICROGram(s) Oral daily    MEDICATIONS  (PRN):      Allergies    No Known Allergies    Intolerances        REVIEW OF SYSTEMS:  CONSTITUTIONAL: No fever, weight loss, or fatigue  EYES: No eye pain, visual disturbances, or discharge  ENMT:  No difficulty hearing, tinnitus, vertigo; No sinus or throat pain  NECK: No pain or stiffness  RESPIRATORY: No cough, wheezing, chills or hemoptysis; No shortness of breath  CARDIOVASCULAR: No chest pain, palpitations, lightheadedness, or leg swelling  GASTROINTESTINAL: No abdominal or epigastric pain. No nausea, vomiting, or hematemesis; No diarrhea or constipation. No melena or hematochezia.  GENITOURINARY: Admits itching and whitish vaginal discharge   NEUROLOGICAL: No headaches, memory loss, vertigo, loss of strength, numbness, or tremors  SKIN: No itching, burning, rashes, or lesions   MUSCULOSKELETAL: No joint pain or swelling; No muscle, back, or extremity pain  PSYCHIATRIC: No depression, anxiety, or mood swings        Vital Signs Last 24 Hrs  T(C): 36.9 (05 May 2025 11:13), Max: 36.9 (05 May 2025 11:13)  T(F): 98.4 (05 May 2025 11:13), Max: 98.4 (05 May 2025 11:13)  HR: 64 (05 May 2025 11:13) (54 - 67)  BP: 110/74 (05 May 2025 11:13) (110/74 - 157/90)  BP(mean): --  RR: 17 (05 May 2025 11:13) (16 - 18)  SpO2: 98% (05 May 2025 11:13) (97% - 100%)    Parameters below as of 05 May 2025 11:13  Patient On (Oxygen Delivery Method): room air        PHYSICAL EXAM:  GENERAL: NAD, well-groomed, well-developed  HEAD:  Atraumatic, Normocephalic  EYES: EOMI, PERRLA, conjunctiva and sclera clear  ENMT: Moist mucous membranes,   NECK: Supple, No JVD, Normal thyroid  NERVOUS SYSTEM:  Alert & Oriented X 3  CHEST/LUNG: Clear to auscultation bilaterally; No rales, rhonchi, wheezing, or rubs  HEART: Regular rate and rhythm; No murmurs, rubs, or gallops  ABDOMEN: Soft, Nontender, Nondistended; Bowel sounds present  Genitourinary: Pt denied vaginal exam   EXTREMITIES:  2+ Peripheral Pulses, No clubbing, cyanosis, or edema  SKIN: No rashes or lesions    LABS:                        12.2   5.64  )-----------( 201      ( 04 May 2025 15:03 )             37.0     05 May 2025 06:00    142    |  105    |  10     ----------------------------<  130    3.8     |  27     |  0.74     Ca    8.9        05 May 2025 06:00    TPro  7.4    /  Alb  3.9    /  TBili  0.4    /  DBili  x      /  AST  21     /  ALT  19     /  AlkPhos  135    04 May 2025 15:03    PT/INR - ( 04 May 2025 15:03 )   PT: 11.7 sec;   INR: 1.01 ratio         PTT - ( 04 May 2025 15:03 )  PTT:41.2 sec  Urinalysis Basic - ( 05 May 2025 06:00 )    Color: x / Appearance: x / SG: x / pH: x  Gluc: 130 mg/dL / Ketone: x  / Bili: x / Urobili: x   Blood: x / Protein: x / Nitrite: x   Leuk Esterase: x / RBC: x / WBC x   Sq Epi: x / Non Sq Epi: x / Bacteria: x

## 2025-05-05 NOTE — DISCHARGE NOTE PROVIDER - NSDCMRMEDTOKEN_GEN_ALL_CORE_FT
amLODIPine 2.5 mg oral tablet: 1 tab(s) orally once a day  aspirin 81 mg oral delayed release tablet: 1 tab(s) orally once a day  atorvastatin 20 mg oral tablet: 1 tab(s) orally once a day  clotrimazole 2% vaginal cream with applicator: 1 applicatorful vaginal once a day  ibuprofen 200 mg oral tablet: 3 tab(s) orally every 6 hours as needed for  moderate pain Must take with food or milk.  May alternate with tylenol so a medication is given every 3 hours.  levothyroxine 88 mcg (0.088 mg) oral tablet: 1 tab(s) orally  Tylenol 500 mg oral tablet: 2 tab(s) orally every 6 hours as needed for  mild pain May alternate with ibuprofen so a medication is given every 3 hours.  Do not exceed 4000 mg in any given 24 hour period.

## 2025-05-05 NOTE — CARE COORDINATION ASSESSMENT. - NSDCPLANSERVICES_GEN_ALL_CORE
45 y/o female with PMH of hypothyroidism, hyperlipidemia, MVP and anemia presented to ED with the complain of dizziness, headache, nausea and blurred vision.  CM met with patient at bedside.  Patient. A&O x 4.    Pt  resting comfortably / Pt has no complaints at this time.  CM explained role of CM and availability to assist with discharge planning throughout stay.   Provided CM contact information and pt. verbalized understanding. Patient lives in a private house with her family, ind in adls before admission. Patients family can assist at home as needed  and  transport her home when medically ready. No anticipated needs identified at this time.  Pt verbalizing understanding and in agreement with d/c plans.    PCP: Dr Saqib Wooten [phone]  Pharm: [facility] [phone]/No Anticipated Discharge Needs

## 2025-05-05 NOTE — CONSULT NOTE ADULT - ASSESSMENT
The patient is a 44 year old female with a history of HL, hypothyroidism, rheumatic heart disease who presents with a headache.    Plan:  - ECG with sinus rhythm and nonspecific findings  - Echo pending  - Monitor on telemetry  - Troponin mildly elevated at 199 in the setting of ?hypertension. Low suspicion for ACS.  - CTA head and neck with no acute pathology or stenosis. Incidental lung nodule noted which will need outpatient follow-up.  - BP was elevated at home which resolved without antihypertensives  - Antiplatelets as per neurology  - Neurology eval  - MRI head planned
43 y/o female with PMH of hypothyroidism, hyperlipidemia, MVP and anemia presented to ED with the complain of dizziness, headache, nausea and blurred vision in both eyes.   BP was elevated.  NIHSS  -0  CT head  -No acute pathology  CTA head and Neck  -  No LVOs    BP normalized  No symptoms now.    B/L symptoms hence CVA is unlikely   Areli had Hypertensive Urgency  PT also didn't sleep all night as she was baby sitting grand daughter     BP meds  Ecotrin 81/Plavix 75  Lipitor 80    DC pt home  F/up with her PMD/Neurologist under her plan  -Dc rec Prompt MRI brain    D/w pt  She agrees.  D/w Dr. Contreras

## 2025-05-05 NOTE — PHYSICAL THERAPY INITIAL EVALUATION ADULT - PERTINENT HX OF CURRENT PROBLEM, REHAB EVAL
43 y/o female with PMH of hypothyroidism, hyperlipidemia, MVP and anemia presented to ED with the complain of dizziness, headache, nausea and blurred vision since she woke up at 7 AM.  Patient relates she was in her normal state of health when she went to bed at approximately 3 AM when she woke up had mild symptoms but they worsened over the past few hours.  Patient relates dizziness with slight unsteady feeling.  Patient denies fevers, chills, weakness, numbness, vomiting, chest pain, shortness of breath, cough, abdominal pain, rash, neck stiffness, photophobia. Stroke code was called in the ED.   CT head, CTA head and neck negative for stroke, Tele stroke and neurology were consulted, recommended admission.    During my evaluation she reported her symptoms has resolved. Her daughter at bedside, she stated pt never had high blood pressure and she does not take any other medications except Levothyroxine.

## 2025-05-05 NOTE — PHYSICAL THERAPY INITIAL EVALUATION ADULT - WEIGHT-BEARING RESTRICTIONS: GAIT, REHAB EVAL
Group Topic: BH CLEMENTE Process Group    Date: 6/28/2022  Start Time: 1345  End Time: 1430  Facilitators: IVANNA Lujan    Focus:  process check-out  Number in attendance: 9      Check-out group is an opportunity for Patient's to have guided reflection on treatment day and progression. Patients process goals and strategies for their evening for continual recovery    Goals: :  practice self-reflective tools and process treatment day  and goals for evening.   Method: Group  Attendance: Present  Mood/Affect: Appropriate  Behavior/Socialization: Appropriate to group  Participation: Active  Overall Patient Response to Group: Appropriate to topic  Individual Response to Group: Patient was willing to engage in group by providing encouragement to peer.   Ability to Apply Content to Group: 5     IVANNA Lujan             Never full weight-bearing

## 2025-05-05 NOTE — CASE MANAGEMENT PROGRESS NOTE - NSCMPROGRESSNOTE_GEN_ALL_CORE
Patient cleared for discharge today. Patient is recommended to follow up with neurologist, patient states she will make her own apt for follow. CM provided list on neurologist in her area. 	Patients family will be available to transport patient home.

## 2025-05-05 NOTE — DISCHARGE NOTE PROVIDER - CARE PROVIDER_API CALL
Je aLla  [facility]  [address], [address]  [place] [address]  Phone: [phone]  Fax: [phone]  Follow Up Time:

## 2025-05-05 NOTE — OCCUPATIONAL THERAPY INITIAL EVALUATION ADULT - ADDITIONAL COMMENTS
Pt lives w/ her family 3 SUKUMAR and 10-12 steps inside. + stall Pt reports being Independent w/ all ambulation and ADL's PTA and does not own any DME/AE

## 2025-05-05 NOTE — OCCUPATIONAL THERAPY INITIAL EVALUATION ADULT - LEVEL OF INDEPENDENCE: STAND/SIT, REHAB EVAL
Pre-Operative:  1. Patient/Caregiver identifies - states name and date of birth. 2.  The patient is free from signs and symptoms of injury. 3.  The patient receives appropriate medication(s), safely administered during the Perioperative period. 4.  The patient is free from signs and symptoms of infection. 5.  The patient has wound / tissue perfusion. 6.  The patients's fluid, electrolyte, and acid-base balances are established preoperatively. 7.  The patient's pulmonary function is established preoperatively. 8.  The patient's cardiovascular status is established preoperatively. 9.  The patient / caregiver demonstrates knowledge of nutritional management related to the operative or other invasive procedure. 10. The patient/caregiver demonstrates knowledge of medication management. 11. The patient/caregiver demonstrates knowledge of pain management. 12.  The patient participates in the rehabilitation process as applicable. 13.  The patient/caregiver participates in decisions affection his or her Perioperative plan of care. 14.  The patient's care is consistent with the individualized Perioperative plan of care. 15.  The patient's right to privacy is maintained. 16.  The patient is the recipient of competent and ethical care within legal standards of practice. 17.  The patient's value system, lifestyle, ethnicity, and culture are considered, respected, and incorporated in the Perioperative plan of care and understands special services available. 18.  The patient demonstrates and/or reports adequate pain control throughout the the Perioperative period. 19. The patient's neurological status is established preoperatively. 20. The patient/caregiver demonstrates knowledge of the expected responses to the operative or invasive procedure. 21.  Patient/Caregiver has reduced anxiety. Interventions- Familiarize with environment and equipment.   22. Patient/Caregiver verbalizes understanding of Phase I and Phase II process. 23.  Patient pain level is established preoperatively using age appropriate pain scale. 24.  The patient will move to fall risk upon sedation- during and through the recovery phase. Interventions- orient the patient to the environment, especially the location of the bathroom; provide treaded socks/non-skid footwear; demonstrate and teach back use of the nurse's call system; instruct the patient to call for help before getting out of bed; lock all movable equipment before transferring patient; keep bed in lowest position possible.  25.  Other: pt states she has no difficulty with transfers or ambulation

## 2025-05-05 NOTE — CONSULT NOTE ADULT - SUBJECTIVE AND OBJECTIVE BOX
History of Present Illness: The patient is a 44 year old female with a history of HL, hypothyroidism, rheumatic heart disease who presents with a headache. She noted waking up with a headache yesterday. There was some blurry vision. She had a slight lightheadedness with it. She checked her BPs and they were elevated at 150-180 / . No chest pain or shortness of breath.    Past Medical/Surgical History:  HL, hypothyroidism, rheumatic heart disease    Medications:  Home Medications:  ibuprofen 200 mg oral tablet: 3 tab(s) orally every 6 hours as needed for  moderate pain Must take with food or milk.  May alternate with tylenol so a medication is given every 3 hours. (10 Aroldo 2025 10:10)  levothyroxine 88 mcg (0.088 mg) oral tablet: 1 tab(s) orally (10 Aroldo 2025 06:08)  Tylenol 500 mg oral tablet: 2 tab(s) orally every 6 hours as needed for  mild pain May alternate with ibuprofen so a medication is given every 3 hours.  Do not exceed 4000 mg in any given 24 hour period. (10 Aroldo 2025 10:10)      Family History: Non-contributory family history of premature cardiovascular atherosclerotic disease    Social History: No tobacco, alcohol or drug use    Review of Systems:  General: No fevers, chills, weight gain  Skin: No rashes, color changes  Cardiovascular: No chest pain, orthopnea  Respiratory: No shortness of breath, cough  Gastrointestinal: No nausea, abdominal pain  Genitourinary: No incontinence, pain with urination  Musculoskeletal: No pain, swelling, decreased range of motion  Neurological: No headache, weakness  Psychiatric: No depression, anxiety  Endocrine: No weight gain, increased thirst  All other systems are comprehensively negative.    Physical Exam:  Vitals:        Vital Signs Last 24 Hrs  T(C): 36.4 (05 May 2025 07:37), Max: 36.7 (04 May 2025 14:56)  T(F): 97.6 (05 May 2025 07:37), Max: 98.1 (04 May 2025 14:56)  HR: 67 (05 May 2025 07:37) (54 - 67)  BP: 122/66 (05 May 2025 03:25) (122/66 - 157/90)  BP(mean): --  RR: 18 (05 May 2025 07:37) (16 - 18)  SpO2: 100% (05 May 2025 07:37) (97% - 100%)    Parameters below as of 05 May 2025 07:37  Patient On (Oxygen Delivery Method): room air      General: NAD  HEENT: MMM  Neck: No JVD, no carotid bruit  Lungs: CTAB  CV: RRR, nl S1/S2, no M/R/G  Abdomen: S/NT/ND, +BS  Extremities: No LE edema, no cyanosis  Neuro: AAOx3, non-focal  Skin: No rash    Labs:                        12.2   5.64  )-----------( 201      ( 04 May 2025 15:03 )             37.0     05-05    142  |  105  |  10  ----------------------------<  130[H]  3.8   |  27  |  0.74    Ca    8.9      05 May 2025 06:00    TPro  7.4  /  Alb  3.9  /  TBili  0.4  /  DBili  x   /  AST  21  /  ALT  19  /  AlkPhos  135[H]  05-04        PT/INR - ( 04 May 2025 15:03 )   PT: 11.7 sec;   INR: 1.01 ratio         PTT - ( 04 May 2025 15:03 )  PTT:41.2 sec    ECG/Telemetry: NSR, normal axis, nonspecific ST abnormality

## 2025-05-05 NOTE — CONSULT NOTE ADULT - SUBJECTIVE AND OBJECTIVE BOX
Patient is a 44y old  Female who presents with a chief complaint of Dizziness (05 May 2025 11:28)      HPI:  45 y/o female with PMH of hypothyroidism, hyperlipidemia, MVP and anemia presented to ED with the complain of dizziness, headache, nausea and blurred vision since she woke up at 7 AM.  Patient relates she was in her normal state of health when she went to bed at approximately 3 AM when she woke up had mild symptoms but they worsened over the past few hours.  Patient relates dizziness with slight unsteady feeling.  Patient denies fevers, chills, weakness, numbness, vomiting, chest pain, shortness of breath, cough, abdominal pain, rash, neck stiffness, photophobia. Stroke code was called in the ED.   CT head, CTA head and neck negative for stroke, Tele stroke and neurology were consulted, recommended admission.    During my evaluation she reported her symptoms has resolved. Her daughter at bedside, she stated pt never had high blood pressure and she does not take any other medications except Levothyroxine.   (04 May 2025 16:35)    DRAFT  PAST MEDICAL & SURGICAL HISTORY:  Hypothyroid      HLD (hyperlipidemia)      H/O: rheumatic fever      Mitral valve prolapse      Endometrial polyp      History of fracture of right ankle      Ovarian cyst      Renal cyst      Anemia      S/P ORIF (open reduction internal fixation) fracture  right ankle 6/2018      H/O tubal ligation      History of removal of retained hardware      History of D&C          MEDICATIONS  (STANDING):  aspirin enteric coated 81 milliGRAM(s) Oral daily  atorvastatin 80 milliGRAM(s) Oral at bedtime  clopidogrel Tablet 75 milliGRAM(s) Oral daily  clotrimazole 2% Vaginal Cream 1 Applicatorful Vaginal daily  enoxaparin Injectable 40 milliGRAM(s) SubCutaneous every 24 hours  levothyroxine 88 MICROGram(s) Oral daily    MEDICATIONS  (PRN):      Allergies    No Known Allergies    Intolerances        SOCIAL HISTORY:    No h/o Smoking.   No h/o alcohol use.  Ex Smoker. Quite in past.  Pt smokes.  Pt does drink socially.  Pt drinks alcohol heavily.    FAMILY HISTORY:  Family history of diabetes mellitus (Mother)    Family history of cancer in father (Father)        REVIEW OF SYSTEMS:    CONSTITUTIONAL: No fever  EYES: No eye pain,   ENMT:  No sinus or throat pain  NECK: No pain or stiffness  RESPIRATORY: No cough, No hemoptysis; No shortness of breath  CARDIOVASCULAR: No acute chest pain, palpitations,  or leg swelling  GASTROINTESTINAL: No abdominal pain. No nausea, vomiting, or hematemesis;  No melena or hematochezia.  GENITOURINARY: No  hematuria, or incontinence  MUSCULOSKELETAL: No joint swelling; No extremity pain  SKIN: No itching, rashes, or lesions   LYMPH NODES: No enlarged glands  NEUROLOGICAL: No headaches, memory loss,   PSYCHIATRIC: No depression, anxiety, mood swings, or difficulty sleeping  ENDOCRINE: No heat or cold intolerance;   HEME/LYMPH: No easy bruising, or bleeding gums  Allergy/Immunology. No medication allergy. No seasonal allergies.    PHYSICAL EXAM:  Vital Signs Last 24 Hrs  T(F): 98.4 (05-05-25 @ 11:13)  HR: 64 (05-05-25 @ 11:13)  BP: 110/74 (05-05-25 @ 11:13)  RR: 17 (05-05-25 @ 11:13)    GENERAL: NAD, well-groomed, well-developed  HEAD:  Atraumatic, Normocephalic  EYES: EOMI, PERRLA, conjunctiva and sclera clear  NECK: Supple, No JVD, thyroid non-palpable    On Neurological Examination:    Mental Status - Pt is alert, awake, oriented X3. Higher functions are intact. Pt. does have mild poor cognition. Follows commands well and able to answer questions appropriately.    Speech -  Normal. Slurred. Pt has no aphasia.    Cranial Nerves - Pupils 3 mm equal and reactive to light, extraocular eye movements intact. Pt has no visual field deficit.  Pt has no right left facial asymmetry. Tongue - is in midline.    Motor Exam - 4 plus/5 all over, No drift. No shaking or tremors.  Muscle tone - is normal all over. Moves all extremities equally. No asymmetry is seen.      Sensory Exam - Pin prick, temperature, joint position and vibration are intact on either side. Pt withdraws all extremities equally on stimulation. No asymmetry seen. No complaints of tingling, numbness.    Gait - Able to stand and walk unassisted. Pt is able to stand up with holding my hands and is able to walk for few feet around the bed. Not falling to either side.    Deep tendon Reflexes - 2 plus all over.    Coordination - Fine finger movements are normal on both sides. Finger to nose is also normal on both sides.       Romberg - Negative.    Neck Supple -  Yes.    LABS:                        12.2   5.64  )-----------( 201      ( 04 May 2025 15:03 )             37.0     05-05    142  |  105  |  10  ----------------------------<  130[H]  3.8   |  27  |  0.74    Ca    8.9      05 May 2025 06:00    TPro  7.4  /  Alb  3.9  /  TBili  0.4  /  DBili  x   /  AST  21  /  ALT  19  /  AlkPhos  135[H]  05-04    PT/INR - ( 04 May 2025 15:03 )   PT: 11.7 sec;   INR: 1.01 ratio         PTT - ( 04 May 2025 15:03 )  PTT:41.2 sec  Urinalysis Basic - ( 05 May 2025 06:00 )    Color: x / Appearance: x / SG: x / pH: x  Gluc: 130 mg/dL / Ketone: x  / Bili: x / Urobili: x   Blood: x / Protein: x / Nitrite: x   Leuk Esterase: x / RBC: x / WBC x   Sq Epi: x / Non Sq Epi: x / Bacteria: x    RADIOLOGY & ADDITIONAL STUDIES:    r< from: CT Brain Stroke Protocol (05.04.25 @ 15:13) >    NONCONTRAST HEAD CT:  1.  No acute intracranial abnormality.    Dr. Guevara opened this code stroke head CT at 5/4/2025 3:32 PM.  Dr. Guevara initiated communication of this result at 5/4/2025 3:36 PM.  Critical findings discussed with Dr. Walters by Dr. August Guevara at   5/4/2025 3:38 PM.    CTA HEAD AND NECK:  1.   CTA HEAD: No large vessel occlusion, aneurysm, or hemodynamically   significant stenosis.  2.   CTA NECK: No occlusion, dissection, aneurysm, or hemodynamically   significant stenosis    < end of copied text >     Patient is a 44y old  Female who presents with a chief complaint of Dizziness (05 May 2025 11:28)    HPI: 45 y/o female with PMH of hypothyroidism, hyperlipidemia, MVP and anemia presented to ED with the complain of dizziness, headache, nausea and blurred vision in both eyes. since she woke up at 7 AM.  Patient relates she was in her normal state of health when she went to bed at approximately 3 AM when she woke up had mild symptoms but they worsened over the past few hours.  Patient relates dizziness with slight unsteady feeling.  Patient denies fevers, chills, weakness, numbness, vomiting, chest pain, shortness of breath, cough, abdominal pain, rash, neck stiffness, photophobia. Stroke code was called in the ED.   CT head, CTA head and neck negative for stroke, Tele stroke and neurology were consulted, recommended admission.    BP was elevated.  During my evaluation she reported her symptoms has resolved. Her daughter at bedside, she stated pt never had high blood pressure and she does not take any other medications except Levothyroxine.   (04 May 2025 16:35)    No symptoms now    NIHSS  -0  CT head  -No acute pathology  CTA head and Neck  -  No LVOs      PAST MEDICAL & SURGICAL HISTORY:  Hypothyroid    HLD (hyperlipidemia)    H/O: rheumatic fever    Mitral valve prolapse    Endometrial polyp    History of fracture of right ankle    Ovarian cyst    Renal cyst    Anemia    S/P ORIF (open reduction internal fixation) fracture  right ankle 6/2018    H/O tubal ligation    History of removal of retained hardware    History of D&C    MEDICATIONS  (STANDING):    aspirin enteric coated 81 milliGRAM(s) Oral daily  atorvastatin 80 milliGRAM(s) Oral at bedtime  clopidogrel Tablet 75 milliGRAM(s) Oral daily  clotrimazole 2% Vaginal Cream 1 Applicatorful Vaginal daily  enoxaparin Injectable 40 milliGRAM(s) SubCutaneous every 24 hours  levothyroxine 88 MICROGram(s) Oral daily    MEDICATIONS  (PRN):    Allergies    No Known Allergies    SOCIAL HISTORY:    No h/o Smoking.   No h/o alcohol use.      FAMILY HISTORY:  Family history of diabetes mellitus (Mother)    Family history of cancer in father (Father)    REVIEW OF SYSTEMS:    CONSTITUTIONAL: No fever  EYES: No eye pain,   ENMT:  No sinus or throat pain  NECK: No pain or stiffness  RESPIRATORY: No cough, No hemoptysis; No shortness of breath  CARDIOVASCULAR: No acute chest pain, palpitations,  or leg swelling  GASTROINTESTINAL: No abdominal pain. No nausea, vomiting, or hematemesis;    GENITOURINARY: No  hematuria, or incontinence  MUSCULOSKELETAL: No joint swelling; No extremity pain  SKIN: No itching, rashes, or lesions   LYMPH NODES: No enlarged glands  NEUROLOGICAL: No headaches, memory loss,   PSYCHIATRIC: No depression, anxiety, mood swings, or difficulty sleeping  ENDOCRINE: No heat or cold intolerance;   HEME/LYMPH: No easy bruising, or bleeding gums  Allergy/Immunology. No medication allergy. No seasonal allergies.    PHYSICAL EXAM:  Vital Signs Last 24 Hrs  T(F): 98.4 (05-05-25 @ 11:13)  HR: 64 (05-05-25 @ 11:13)  BP: 110/74 (05-05-25 @ 11:13)  RR: 17 (05-05-25 @ 11:13)    GENERAL: NAD, well-groomed, well-developed  HEAD:  Atraumatic, Normocephalic  EYES: EOMI, PERRLA, conjunctiva and sclera clear  NECK: Supple, No JVD    On Neurological Examination:    Mental Status - Pt is alert, awake, oriented X3. Higher functions are intact.  Follows commands well and able to answer questions appropriately.    Speech -  Normal. Pt has no aphasia.    Cranial Nerves - Pupils 3 mm equal and reactive to light, extraocular eye movements intact. Pt has no visual field deficit.  No facial asymmetry. Tongue - is in midline.    Motor Exam - 4 plus/5 all over, No drift. No shaking or tremors.    Sensory Exam - Pin prick, temperature, joint position and vibration are intact on either side.     Gait - Able to stand and walk unassisted.     Deep tendon Reflexes - 2 plus all over.    Coordination - Fine finger movements are normal on both sides. Finger to nose is also normal on both sides.       Romberg - Negative.    Neck Supple -  Yes.    LABS:                        12.2   5.64  )-----------( 201      ( 04 May 2025 15:03 )             37.0     05-05    142  |  105  |  10  ----------------------------<  130[H]  3.8   |  27  |  0.74    Ca    8.9      05 May 2025 06:00    TPro  7.4  /  Alb  3.9  /  TBili  0.4  /  DBili  x   /  AST  21  /  ALT  19  /  AlkPhos  135[H]  05-04    PT/INR - ( 04 May 2025 15:03 )   PT: 11.7 sec;   INR: 1.01 ratio         PTT - ( 04 May 2025 15:03 )  PTT:41.2 sec  Urinalysis Basic - ( 05 May 2025 06:00 )    Color: x / Appearance: x / SG: x / pH: x  Gluc: 130 mg/dL / Ketone: x  / Bili: x / Urobili: x   Blood: x / Protein: x / Nitrite: x   Leuk Esterase: x / RBC: x / WBC x   Sq Epi: x / Non Sq Epi: x / Bacteria: x    RADIOLOGY & ADDITIONAL STUDIES:    r< from: CT Brain Stroke Protocol (05.04.25 @ 15:13) >    NONCONTRAST HEAD CT    1.  No acute intracranial abnormality.    CTA HEAD AND NECK:    1.   CTA HEAD: No large vessel occlusion, aneurysm, or hemodynamically significant stenosis.  2.   CTA NECK: No occlusion, dissection, aneurysm, or hemodynamically significant stenosis    < end of copied text >

## 2025-05-05 NOTE — DISCHARGE NOTE PROVIDER - HOSPITAL COURSE
43 y/o female with PMH of hypothyroidism, hyperlipidemia, MVP and anemia  was admitted for hypertensive emergency . CT head, CTA head and neck negative for stroke, Tele stroke and neurology were consulted. Pt was treated with aspirin, Plavix, Lipitor. ECHO done. Cardiology was consulted. recs appreciated. Her BP has improved without any BP meds. It was deemed that her symptoms were most likely due to hypertensive emergency not due to stroke, her home meds were resumed. Labs were monitored routinely. She is hemodynamically stable for discharge. Pt was instructed to follow up with neurology out patient.

## 2025-05-05 NOTE — CARE COORDINATION ASSESSMENT. - NSPASTMEDSURGHISTORY_GEN_ALL_CORE_FT
PAST MEDICAL & SURGICAL HISTORY:  HLD (hyperlipidemia)      Hypothyroid      Mitral valve prolapse      H/O: rheumatic fever      S/P ORIF (open reduction internal fixation) fracture  right ankle 6/2018      Anemia      Renal cyst      Ovarian cyst      History of fracture of right ankle      Endometrial polyp      History of removal of retained hardware      H/O tubal ligation      History of D&C

## 2025-05-12 ENCOUNTER — OFFICE (OUTPATIENT)
Dept: URBAN - METROPOLITAN AREA CLINIC 27 | Facility: CLINIC | Age: 45
Setting detail: OPHTHALMOLOGY
End: 2025-05-12
Payer: COMMERCIAL

## 2025-05-12 DIAGNOSIS — H52.4: ICD-10-CM

## 2025-05-12 DIAGNOSIS — E11.9: ICD-10-CM

## 2025-05-12 DIAGNOSIS — H11.153: ICD-10-CM

## 2025-05-12 PROBLEM — H52.7 REFRACTIVE ERROR: Status: ACTIVE | Noted: 2025-05-12

## 2025-05-12 PROCEDURE — 92004 COMPRE OPH EXAM NEW PT 1/>: CPT | Performed by: OPHTHALMOLOGY

## 2025-05-12 PROCEDURE — 92015 DETERMINE REFRACTIVE STATE: CPT | Performed by: OPHTHALMOLOGY

## 2025-05-12 ASSESSMENT — REFRACTION_MANIFEST
OD_CYLINDER: +0.25
OD_AXIS: 100
OS_SPHERE: -2.00
OS_AXIS: 135
OD_VA1: 20/20
OD_ADD: +1.50
OS_VA1: 20/20
OS_ADD: +1.50
OD_SPHERE: -1.50
OS_CYLINDER: +0.75

## 2025-05-12 ASSESSMENT — TONOMETRY
OD_IOP_MMHG: 14
OS_IOP_MMHG: 12

## 2025-05-12 ASSESSMENT — VISUAL ACUITY
OD_BCVA: 20/30
OS_BCVA: 20/40

## 2025-05-12 ASSESSMENT — KERATOMETRY
OD_K1POWER_DIOPTERS: 45.75
OD_K2POWER_DIOPTERS: 46.50
OS_K1POWER_DIOPTERS: 45.75
OD_AXISANGLE_DEGREES: 97
OS_AXISANGLE_DEGREES: 137
OS_K2POWER_DIOPTERS: 46.75

## 2025-05-12 ASSESSMENT — REFRACTION_AUTOREFRACTION
OS_SPHERE: -1.75
OD_SPHERE: -2.00
OD_CYLINDER: +0.25
OS_AXIS: 140
OS_CYLINDER: +0.75
OD_AXIS: 99

## 2025-05-12 ASSESSMENT — CONFRONTATIONAL VISUAL FIELD TEST (CVF)
OD_FINDINGS: FULL
OS_FINDINGS: FULL

## 2025-07-21 ENCOUNTER — NON-APPOINTMENT (OUTPATIENT)
Age: 45
End: 2025-07-21

## (undated) DEVICE — TUBING STRYKEFLOW II SUCTION / IRRIGATOR

## (undated) DEVICE — XI CANNULA SEAL 5MM - 8 MM

## (undated) DEVICE — XI OBTURATOR OPTICAL BLADELESS 8MM

## (undated) DEVICE — TUBING AIRSEAL TRI-LUMEN FILTERED

## (undated) DEVICE — GOWN TRIMAX LG

## (undated) DEVICE — DRAPE INSTRUMENT POUCH 6.75" X 11"

## (undated) DEVICE — POSITIONER PURPLE ARM ONE STEP (LARGE)

## (undated) DEVICE — PACK GYN LAPAROSCOPY

## (undated) DEVICE — DRSG TELFA 3 X 8

## (undated) DEVICE — SUT POLYSORB 4-0 P-12 UNDYED

## (undated) DEVICE — XI ARM SCISSOR MONO CURVED

## (undated) DEVICE — TROCAR SURGIQUEST AIRSEAL 8MM X 100MM

## (undated) DEVICE — APPLICATOR VISTASEAL LAP DUAL 35CM RIGID

## (undated) DEVICE — DRSG MASTISOL

## (undated) DEVICE — SUT VICRYL PLUS 0 27" CT-3

## (undated) DEVICE — XI DRAPE COLUMN

## (undated) DEVICE — DRSG ALLEVYN BORDER LITE 2X2"

## (undated) DEVICE — ABDOMINAL BINDER MED/LG 12" X 36"-54"

## (undated) DEVICE — SOL IRR POUR H2O 250ML

## (undated) DEVICE — BLADE SCALPEL SAFETYLOCK #15

## (undated) DEVICE — XI DRAPE ARM

## (undated) DEVICE — VENODYNE/SCD SLEEVE CALF LARGE

## (undated) DEVICE — XI ARM FORCEP FENESTRATED BIPOLAR 8MM

## (undated) DEVICE — DRAPE LIGHT HANDLE COVER (BLUE)

## (undated) DEVICE — APPLICATOR ENDOSCOPIC FOR SUGIFLO

## (undated) DEVICE — SOL IRR POUR NS 0.9% 500ML

## (undated) DEVICE — FOLEY TRAY 16FR LF URINE METER SURESTEP

## (undated) DEVICE — DRAPE LARGE SHEET 72X85"

## (undated) DEVICE — XI TIP COVER

## (undated) DEVICE — POSITIONER PINK PAD PIGAZZI SYSTEM XL W ARM PROTECTOR

## (undated) DEVICE — LIGASURE BLUNT TIP 5MM X 37CM

## (undated) DEVICE — PACK GENERAL LAPAROSCOPY

## (undated) DEVICE — GELPOINT ADVANCED

## (undated) DEVICE — ENDOCATCH II 15MM

## (undated) DEVICE — LUBRICATING JELLY ONESHOT 1.25OZ

## (undated) DEVICE — XI ARM PERMANENT CAUTERY HOOK

## (undated) DEVICE — WARMING BLANKET UPPER ADULT

## (undated) DEVICE — XI VESSEL SEALER

## (undated) DEVICE — UTERINE MANIPULATOR CONMED VCARE LG 37MM

## (undated) DEVICE — D HELP - CLEARVIEW CLEARIFY SYSTEM

## (undated) DEVICE — ELECTRO LUBE ANTI-STICK SOLUTION FOR CAUTERY TIP

## (undated) DEVICE — ENDOCATCH 10MM

## (undated) DEVICE — XI ARM PERMANENT CAUTERY SPATULA

## (undated) DEVICE — TROCAR COVIDIEN VERSASTEP PLUS 12MM LONG

## (undated) DEVICE — BASIN SET DOUBLE

## (undated) DEVICE — XI ARM NEEDLE DRIVER MEGA

## (undated) DEVICE — STAPLER SKIN VISI-STAT 35 WIDE

## (undated) DEVICE — UTERINE MANIPULATOR CONMED VCARE MED 34MM

## (undated) DEVICE — TROCAR APPLIED MEDICAL KII BALLOON BLUNT TIP 12MM X 130MM

## (undated) DEVICE — GOWN XXXL

## (undated) DEVICE — DRAPE 3/4 SHEET W REINFORCEMENT 56X77"

## (undated) DEVICE — SPECIMEN CONTAINER 100ML

## (undated) DEVICE — TUBING OLYMPUS INSUFFLATION

## (undated) DEVICE — PREP CHLOROHEXIDINE 4% 118CC KIT

## (undated) DEVICE — PACK PERI GYN